# Patient Record
Sex: FEMALE | Race: WHITE | NOT HISPANIC OR LATINO | Employment: FULL TIME | URBAN - METROPOLITAN AREA
[De-identification: names, ages, dates, MRNs, and addresses within clinical notes are randomized per-mention and may not be internally consistent; named-entity substitution may affect disease eponyms.]

---

## 2019-06-03 ENCOUNTER — APPOINTMENT (OUTPATIENT)
Dept: CT IMAGING | Facility: CLINIC | Age: 34
End: 2019-06-03
Attending: EMERGENCY MEDICINE
Payer: COMMERCIAL

## 2019-06-03 ENCOUNTER — HOSPITAL ENCOUNTER (EMERGENCY)
Facility: CLINIC | Age: 34
Discharge: HOME | End: 2019-06-03
Attending: EMERGENCY MEDICINE | Admitting: EMERGENCY MEDICINE
Payer: COMMERCIAL

## 2019-06-03 VITALS
WEIGHT: 130 LBS | DIASTOLIC BLOOD PRESSURE: 77 MMHG | OXYGEN SATURATION: 98 % | HEIGHT: 69 IN | HEART RATE: 95 BPM | BODY MASS INDEX: 19.26 KG/M2 | TEMPERATURE: 98.6 F | SYSTOLIC BLOOD PRESSURE: 123 MMHG

## 2019-06-03 DIAGNOSIS — R20.2 PARESTHESIA: ICD-10-CM

## 2019-06-03 DIAGNOSIS — M54.2 NECK PAIN: ICD-10-CM

## 2019-06-03 LAB
B-HCG FREE SERPL-ACNC: <5 IU/L
CREAT BLD-MCNC: 0.6 MG/DL (ref 0.52–1.04)
GFR SERPL CREATININE-BSD FRML MDRD: >90 ML/MIN/{1.73_M2}

## 2019-06-03 PROCEDURE — 25000128 H RX IP 250 OP 636: Performed by: EMERGENCY MEDICINE

## 2019-06-03 PROCEDURE — 25000125 ZZHC RX 250: Performed by: EMERGENCY MEDICINE

## 2019-06-03 PROCEDURE — 99285 EMERGENCY DEPT VISIT HI MDM: CPT | Mod: 25

## 2019-06-03 PROCEDURE — 70450 CT HEAD/BRAIN W/O DYE: CPT

## 2019-06-03 PROCEDURE — 25000132 ZZH RX MED GY IP 250 OP 250 PS 637: Performed by: EMERGENCY MEDICINE

## 2019-06-03 PROCEDURE — 70498 CT ANGIOGRAPHY NECK: CPT

## 2019-06-03 PROCEDURE — 82565 ASSAY OF CREATININE: CPT

## 2019-06-03 PROCEDURE — 84702 CHORIONIC GONADOTROPIN TEST: CPT

## 2019-06-03 RX ORDER — METHOCARBAMOL 500 MG/1
500 TABLET, FILM COATED ORAL ONCE
Status: COMPLETED | OUTPATIENT
Start: 2019-06-03 | End: 2019-06-03

## 2019-06-03 RX ORDER — IOPAMIDOL 755 MG/ML
70 INJECTION, SOLUTION INTRAVASCULAR ONCE
Status: COMPLETED | OUTPATIENT
Start: 2019-06-03 | End: 2019-06-03

## 2019-06-03 RX ORDER — LIDOCAINE 4 G/G
1 PATCH TOPICAL ONCE
Status: DISCONTINUED | OUTPATIENT
Start: 2019-06-03 | End: 2019-06-03 | Stop reason: HOSPADM

## 2019-06-03 RX ORDER — IBUPROFEN 600 MG/1
600 TABLET, FILM COATED ORAL ONCE
Status: COMPLETED | OUTPATIENT
Start: 2019-06-03 | End: 2019-06-03

## 2019-06-03 RX ORDER — LIDOCAINE 50 MG/G
1 PATCH TOPICAL EVERY 24 HOURS
Qty: 10 PATCH | Refills: 0 | Status: SHIPPED | OUTPATIENT
Start: 2019-06-03 | End: 2019-06-13

## 2019-06-03 RX ORDER — METHOCARBAMOL 500 MG/1
500 TABLET, FILM COATED ORAL 3 TIMES DAILY PRN
Qty: 15 TABLET | Refills: 0 | Status: SHIPPED | OUTPATIENT
Start: 2019-06-03 | End: 2021-03-18

## 2019-06-03 RX ORDER — ACETAMINOPHEN 500 MG
1000 TABLET ORAL ONCE
Status: COMPLETED | OUTPATIENT
Start: 2019-06-03 | End: 2019-06-03

## 2019-06-03 RX ADMIN — ACETAMINOPHEN 1000 MG: 500 TABLET, FILM COATED ORAL at 06:32

## 2019-06-03 RX ADMIN — LIDOCAINE 1 PATCH: 560 PATCH PERCUTANEOUS; TOPICAL; TRANSDERMAL at 06:32

## 2019-06-03 RX ADMIN — IBUPROFEN 600 MG: 600 TABLET ORAL at 06:33

## 2019-06-03 RX ADMIN — IOPAMIDOL 70 ML: 755 INJECTION, SOLUTION INTRAVENOUS at 05:21

## 2019-06-03 RX ADMIN — SODIUM CHLORIDE 90 ML: 9 INJECTION, SOLUTION INTRAVENOUS at 05:21

## 2019-06-03 RX ADMIN — METHOCARBAMOL 500 MG: 500 TABLET, FILM COATED ORAL at 04:56

## 2019-06-03 ASSESSMENT — MIFFLIN-ST. JEOR: SCORE: 1359.06

## 2019-06-03 ASSESSMENT — ENCOUNTER SYMPTOMS
NECK PAIN: 1
NUMBNESS: 1
BACK PAIN: 1

## 2019-06-03 NOTE — ED TRIAGE NOTES
Neck and bilat shoulder pain.  Occasional arm tingling each side.  Injured lifting groceries last week

## 2019-06-03 NOTE — ED AVS SNAPSHOT
Emergency Department  [address] [place] [address]  Phone:  [phone]  Fax:  [phone]                                    Mignon Treviño   MRN: [id number]    Department:   Emergency Department   Date of Visit:  6/3/2019           After Visit Summary Signature Page    I have received my discharge instructions, and my questions have been answered. I have discussed any challenges I see with this plan with the nurse or doctor.    ..........................................................................................................................................  Patient/Patient Representative Signature      ..........................................................................................................................................  Patient Representative Print Name and Relationship to Patient    ..................................................               ................................................  Date                                   Time    ..........................................................................................................................................  Reviewed by Signature/Title    ...................................................              ..............................................  Date                                               Time          22EPIC Rev 08/18

## 2019-06-03 NOTE — ED PROVIDER NOTES
History     Chief Complaint:  Neck Pain and Shoulder Pain    The history is provided by the patient.      Mignon Treviño is a 34 year old female who presents with neck pain. The patient states that last Saturday (5/25/19) the patient was carrying heavy bags and developed pain in her right neck. The patient reports her pain seems to start from her neck and it alternates between the left and right side but never both. The patient states that she will sometime experience shooting pain from her neck up to the base of her skull. The patient states that she currently feels pain on the right side of neck by her trapezius, numbness and tingling down her right arm and right leg in a strip on the lateral aspect. The patient has been managing her pain using ibuprofen and tylenol, she last took tylenol at 1900 and ibuprofen at 2225. The patient states that she came to the ED because she was experiencing so much pain she could not sleep. The patient is able to walk but laying down causes more pressure in her neck.    Allergies:  Penicillins  Amoxicillin    Medications:    The patient is not currently taking any prescribed medications.    Past Medical History:    The patient does not have any past pertinent medical history.    Past Surgical History:    [place] teeth extraction    Family History:    Hypertension  Hyperlipidemia    Social History:  Smoking status: Yes  Alcohol use: Yes  Drug use: No  Presents to the ED with family     Review of Systems   Constitutional: Negative for fever.   Respiratory: Negative for shortness of breath.    Cardiovascular: Negative for chest pain.   Gastrointestinal: Negative for abdominal pain.   Musculoskeletal: Positive for back pain and neck pain.   Neurological: Positive for numbness.   All other systems reviewed and are negative.        Physical Exam     Patient Vitals for the past 24 hrs:   BP Temp Temp src Pulse Heart Rate SpO2 Height Weight   06/03/19 0542 122/74 -- -- 73 -- 98 % -- --  "  06/03/19 0445 121/74 -- -- 76 -- 99 % -- --   06/03/19 0332 134/71 98.6  F (37  C) Oral -- 90 99 % 1.753 m (5' 9\") 59 kg (130 lb)       Physical Exam  General: Appears well-developed and well-nourished.   Head: No signs of trauma.   Neck: Normal range of motion. No nuchal rigidity. No cervical adenopathy.  No midline tenderness.  +tightness to right trapezius region.   No midline tenderness.  CV: Normal rate and regular rhythm.    Resp: Effort normal and breath sounds normal. No respiratory distress.   GI: Soft. There is no tenderness.  No rebound or guarding.  Normal bowel sounds.    MSK: Normal range of motion. Ambulatory  Neuro: The patient is alert and oriented to person, place, and time.  Strength in upper/lower extremities normal and symmetrical. Sensation intact to light touch throughout. Speech normal.  Ambulatory.  GCS 15  Skin: Skin is warm and dry. No rash noted.   Psych: normal mood and affect. behavior is normal.       Emergency Department Course   Imaging:  Radiographic findings were communicated with the patient who voiced understanding of the findings.    CT-scan Head w/o contrast:  1. Normal for age.  2. No CT finding of a mass, infarct, or hemorrhage.  Result per radiology.     CT Head Neck Angio with and without contrast:   Head CTA:  1. No branch vessel occlusion, high grade stenosis, aneurysm, or high flow vascular malformation involving proximal [place] of [place] vessels.   Neck CTA:  1. No significant stenosis in the neck vessels based on NASCET criteria.  2. No evidence for dissection  As per radiology.    Laboratory:  ISTAT HCG qualitative Pregnancy POCT: <5.0  Creatinine POCT: 0.6, GFR >90    Interventions:  0456: Robaxin tablet 500 mg PO  0632: Tylenol tablet 1000 mg PO  0633: Ibuprofen 600 mg PO   0632: Lidocaine 4% patch 1 patch transdermal    Emergency Department Course:  0332: Nursing notes and vitals reviewed. I performed an exam of the patient as documented above.     IV inserted. " Medicine administered as documented above. Blood drawn. This was sent to the lab for further testing, results above.    The patient was sent for a head/neck CTA and head CT while in the emergency department, findings above.     0620: I rechecked the patient and discussed the results of her workup thus far.     Findings and plan explained to the Patient. Patient discharged home with instructions regarding supportive care, medications, and reasons to return. The importance of close follow-up was reviewed. The patient was prescribed Lidoderm and Robaxin.    I personally reviewed the laboratory results with the Patient and answered all related questions prior to discharge.     Impression & Plan    Medical Decision Making:  Mignon Treviño is a 34-year-old woman who presents due to right-sided neck pain.  She reports over the last 9 days she has had pain primarily in the right neck but sometimes her travel over to the left side.  She has had some difficulty sleeping secondary to it and she is also noticed some tingling sensation primarily in the right arm on the ulnar aspect of the arm.  On my evaluation, she was neurologically intact.  She has appropriate  strength and sensation was intact to light touch.  She did report some slight sensation in the strip down the right arm primarily.  I did obtain a CTA head and neck to evaluate for possible vertebral dissection and this was negative.  Speaking with the patient, it seems that she has had issues with her neck the past although this seems to be worse than other episodes.  She states typically her pain will resolve within a week and this has lasted longer.  Patient was given Tylenol, ibuprofen, and a lidocaine patch for symptomatic control.  She was also given a referral to physical therapy and is recommended to follow-up with a spine specialist for further evaluation.  She instructed follow up with her primary care doctor and return the ER for any worsening symptoms  particularly numbness or weakness or any other concerns.    Critical Care time:  none    Diagnosis:    ICD-10-CM    1. Neck pain M54.2 [facility]   2. Paresthesia R20.2        Disposition:  discharged to home    Discharge Medications:     Medication List      Started    lidocaine 5 % patch  Commonly known as:  LIDODERM  1 patch, Transdermal, EVERY 24 HOURS     methocarbamol 500 MG tablet  Commonly known as:  ROBAXIN  500 mg, Oral, 3 TIMES DAILY PRN          Scribe Disclosure  I, Ayla Yin, am serving as a scribe at 3:32 AM on 6/3/2019 to document services personally performed by Ricki Bean MD based on my observations and the provider's statements to me.     Ayla Yin  6/3/2019    EMERGENCY DEPARTMENT       Ricki Bean MD  06/04/19 0722

## 2019-06-04 ASSESSMENT — ENCOUNTER SYMPTOMS
ABDOMINAL PAIN: 0
FEVER: 0
SHORTNESS OF BREATH: 0

## 2019-06-18 ENCOUNTER — THERAPY VISIT (OUTPATIENT)
Dept: PHYSICAL THERAPY | Facility: CLINIC | Age: 34
End: 2019-06-18
Attending: EMERGENCY MEDICINE
Payer: COMMERCIAL

## 2019-06-18 DIAGNOSIS — M54.2 NECK PAIN ON RIGHT SIDE: ICD-10-CM

## 2019-06-18 DIAGNOSIS — M54.2 NECK PAIN: ICD-10-CM

## 2019-06-18 PROCEDURE — 97110 THERAPEUTIC EXERCISES: CPT | Mod: GP | Performed by: PHYSICAL THERAPIST

## 2019-06-18 PROCEDURE — 97161 PT EVAL LOW COMPLEX 20 MIN: CPT | Mod: GP | Performed by: PHYSICAL THERAPIST

## 2019-06-18 PROCEDURE — 97112 NEUROMUSCULAR REEDUCATION: CPT | Mod: GP | Performed by: PHYSICAL THERAPIST

## 2019-06-18 NOTE — PROGRESS NOTES
[facility] Initial Evaluation  Subjective:  The history is provided by the patient.   Mignon Treviño is a 34 year old female with a cervical spine condition.  Condition occurred with:  Lifting.  Condition occurred: at home.  This is a new condition  Onset of right neck pain on 5-25-19 after carrying heavy bags up stairs. The pain spread to base of skull, upper trap, with tingling in right arm. Occasionally the right neck pain would shift to left neck. She went to the ED on 6-3-19 because she couldn't sleep d/t the pain. CT scan of head and neck were negative. She works at a desk at a computer. Last week she had to lie down and take breaks throughout the day, she was able to work from home. The pain had been sharp now more dull. By the end of the day she has to go to bed d/t the pain of holding up her head.    Patient reports pain:  Cervical right side and cervical left side.    Pain is described as aching and burning and is intermittent and reported as 6/10.   Pain is worse in the P.M..  Symptoms are exacerbated by sitting, driving and rotating head and relieved by rest.  Since onset symptoms are gradually improving.  Special tests:  CT scan.      General health as reported by patient is good.                  Barriers include:  None as reported by the patient.    Red flags:  None as reported by the patient.                        Objective:  Standing Alignment:    Cervical/Thoracic:  Forward head  Shoulder/UE:  Rounded shoulders                                  Cervical/Thoracic Evaluation    AROM:  AROM Cervical:    Flexion:            75% (R neck pain)  Extension:       75%  Rotation:         Left: 90%     Right: 90%  Side Bend:      Left: 75%     Right:  75%      Headaches: none    DTR's:  not assessed          Cervical Dermatomes:  not assessed                                                                      [facility] Cervical Evaluation    Posture:  Sitting: fair  Standing:  "fair  Protruding Head: no  Wry Neck: no    Other Observations: posture correction: decreased R neck pain but caused \"strain\" in left neck  Movement Loss:    Flexion (Flex): min and pain  Retraction (RET): major  Extension (EXT): min  Lateral Flexion Right (LF R): min  Lateral Flexion Left (LF L): min  Rotation Right (ROT R): min  Rotation Left (ROT L): min                                                 ROS    Retraction in sitting: (limited motion) decreased R UT/neck sx's, produced L UT sx's/NW, increased motion.  Retraction supine with varying towel heights: increased R upper trap, produced R upper scapula sx's during/NW after, no change motion.      Assessment/Plan:    Patient is a 34 year old female with cervical complaints.  Her CC is right neck and UT pain but it can occasionally shift to the left neck/UT. She has had severe pain but improving over past few days. Started her with cervical retraction seated and posture correction. She reported being very fearful with her sx's and feels reassured that she can get better.  Patient has the following significant findings with corresponding treatment plan.                Diagnosis 1:  R neck pain/UT  Pain -  manual therapy, self management, education, directional preference exercise and home program  Decreased ROM/flexibility - manual therapy and therapeutic exercise  Decreased function - therapeutic activities  Impaired posture - neuro re-education    Cumulative Therapy Evaluation is: Low complexity.    Previous and current functional limitations:  (See Goal Flow Sheet for this information)    Short term and Long term goals: (See Goal Flow Sheet for this information)     Communication ability:  Patient appears to be able to clearly communicate and understand verbal and written communication and follow directions correctly.  Treatment Explanation - The following has been discussed with the patient:   RX ordered/plan of care  Anticipated outcomes  Possible risks and " side effects  This patient would benefit from PT intervention to resume normal activities.   Rehab potential is good.    Frequency:  1 X week, once daily  Duration:  for 6 weeks  Discharge Plan:  Achieve all LTG.  Independent in home treatment program.  Reach maximal therapeutic benefit.    Please refer to the daily flowsheet for treatment today, total treatment time and time spent performing 1:1 timed codes.

## 2019-06-27 ENCOUNTER — THERAPY VISIT (OUTPATIENT)
Dept: PHYSICAL THERAPY | Facility: CLINIC | Age: 34
End: 2019-06-27
Payer: COMMERCIAL

## 2019-06-27 DIAGNOSIS — M54.2 NECK PAIN ON RIGHT SIDE: ICD-10-CM

## 2019-06-27 PROCEDURE — 97110 THERAPEUTIC EXERCISES: CPT | Mod: GP | Performed by: PHYSICAL THERAPIST

## 2019-06-27 PROCEDURE — 97530 THERAPEUTIC ACTIVITIES: CPT | Mod: GP | Performed by: PHYSICAL THERAPIST

## 2019-06-27 PROCEDURE — 97140 MANUAL THERAPY 1/> REGIONS: CPT | Mod: GP | Performed by: PHYSICAL THERAPIST

## 2019-07-11 ENCOUNTER — THERAPY VISIT (OUTPATIENT)
Dept: PHYSICAL THERAPY | Facility: CLINIC | Age: 34
End: 2019-07-11
Payer: COMMERCIAL

## 2019-07-11 DIAGNOSIS — M54.2 NECK PAIN ON RIGHT SIDE: ICD-10-CM

## 2019-07-11 PROCEDURE — 97110 THERAPEUTIC EXERCISES: CPT | Mod: GP | Performed by: PHYSICAL THERAPIST

## 2019-07-11 PROCEDURE — 97140 MANUAL THERAPY 1/> REGIONS: CPT | Mod: GP | Performed by: PHYSICAL THERAPIST

## 2019-07-11 PROCEDURE — 97530 THERAPEUTIC ACTIVITIES: CPT | Mod: GP | Performed by: PHYSICAL THERAPIST

## 2019-07-25 ENCOUNTER — THERAPY VISIT (OUTPATIENT)
Dept: PHYSICAL THERAPY | Facility: CLINIC | Age: 34
End: 2019-07-25
Payer: COMMERCIAL

## 2019-07-25 DIAGNOSIS — M54.2 NECK PAIN ON RIGHT SIDE: ICD-10-CM

## 2019-07-25 PROCEDURE — 97140 MANUAL THERAPY 1/> REGIONS: CPT | Mod: GP | Performed by: PHYSICAL THERAPIST

## 2019-07-25 PROCEDURE — 97110 THERAPEUTIC EXERCISES: CPT | Mod: GP | Performed by: PHYSICAL THERAPIST

## 2019-07-25 PROCEDURE — 97112 NEUROMUSCULAR REEDUCATION: CPT | Mod: GP | Performed by: PHYSICAL THERAPIST

## 2019-08-15 ENCOUNTER — THERAPY VISIT (OUTPATIENT)
Dept: PHYSICAL THERAPY | Facility: CLINIC | Age: 34
End: 2019-08-15
Payer: COMMERCIAL

## 2019-08-15 DIAGNOSIS — M54.2 NECK PAIN ON RIGHT SIDE: ICD-10-CM

## 2019-08-15 PROCEDURE — 97112 NEUROMUSCULAR REEDUCATION: CPT | Mod: GP | Performed by: PHYSICAL THERAPIST

## 2019-08-15 PROCEDURE — 97110 THERAPEUTIC EXERCISES: CPT | Mod: GP | Performed by: PHYSICAL THERAPIST

## 2019-08-15 PROCEDURE — 97530 THERAPEUTIC ACTIVITIES: CPT | Mod: GP | Performed by: PHYSICAL THERAPIST

## 2019-08-16 NOTE — PROGRESS NOTES
Subjective:  HPI                    Objective:  System    Physical Exam    General     ROS    Assessment/Plan:    DISCHARGE REPORT    Progress reporting period is from 06/18/2019 to 08/15/2019.       SUBJECTIVE  Subjective: Patient reports her R neck has been doing better.  She reports having days where it barely bothers her, and she has more good days than bad days.  She no longer has to lie down during the day like she did previously due to pain.  She still has some days when it bothers her most of the day, and these days usually occur when she does things she normally would not be doing--i.e.carrying things, driving too long, looking down too long.  Driving will still cause R neck pain 4/10.  Pain is in the R side of her neck now and not into her arm.      Current pain level is 2/10 .     Initial Pain level: 6/10.   Changes in function:  Yes, improved neck motion with less pain, no longer having to lie down during the day.    Adverse reaction to treatment or activity: None    OBJECTIVE  Objective: Cervical AROM:  B rotation WNL, mild increase R neck pain, extension WNL, NE.  Continued significant forward head and rounded shouders posture.  Able to correct with cues.       ASSESSMENT/PLAN  Patient has been seen for 5 visits with treatment focusing on cervical and thoracic extension exercises and mobilizations as well as posture, body mechanics training, and scapular strengthening.  She has made good progress with ROM, pain, and overall function since her initial visit.  She has a good HEP and should see further improvement as she continues with this independently over time.    Updated problem list and treatment plan: Diagnosis 1:  R neck pain  Pain -  self management, education, directional preference exercise and home program  Decreased function - home program  Impaired posture - home program  STG/LTGs have been met or progress has been made towards goals:  Yes, sleep goals have been met.  Driving goals are unmet  due to persistent pain.   Assessment of Progress: The patient's condition is improving.  Self Management Plans:  Patient has been instructed in a home treatment program.  Patient is independent in a home treatment program.  Patient  has been instructed in self management of symptoms.  Patient is independent in self management of symptoms.  Mignon continues to require the following intervention to meet STG and LTG's:  PT intervention is no longer required to meet STG/LTG.    Recommendations:  This patient is ready to be discharged from therapy and continue their home treatment program.    Please refer to the daily flowsheet for treatment today, total treatment time and time spent performing 1:1 timed codes.

## 2020-07-18 ENCOUNTER — OFFICE VISIT (OUTPATIENT)
Dept: URGENT CARE | Facility: URGENT CARE | Age: 35
End: 2020-07-18
Payer: COMMERCIAL

## 2020-07-18 VITALS
TEMPERATURE: 98.3 F | OXYGEN SATURATION: 98 % | DIASTOLIC BLOOD PRESSURE: 78 MMHG | HEART RATE: 91 BPM | BODY MASS INDEX: 18.53 KG/M2 | RESPIRATION RATE: 12 BRPM | SYSTOLIC BLOOD PRESSURE: 142 MMHG | HEIGHT: 69 IN | WEIGHT: 125.13 LBS

## 2020-07-18 DIAGNOSIS — J03.90 TONSILLITIS: ICD-10-CM

## 2020-07-18 DIAGNOSIS — R07.0 THROAT PAIN: Primary | ICD-10-CM

## 2020-07-18 LAB
DEPRECATED S PYO AG THROAT QL EIA: NEGATIVE
SPECIMEN SOURCE: NORMAL

## 2020-07-18 PROCEDURE — 99203 OFFICE O/P NEW LOW 30 MIN: CPT | Performed by: FAMILY MEDICINE

## 2020-07-18 PROCEDURE — 40001204 ZZHCL STATISTIC STREP A RAPID: Performed by: FAMILY MEDICINE

## 2020-07-18 PROCEDURE — 87651 STREP A DNA AMP PROBE: CPT | Performed by: FAMILY MEDICINE

## 2020-07-18 RX ORDER — CEFDINIR 300 MG/1
300 CAPSULE ORAL 2 TIMES DAILY
Qty: 20 CAPSULE | Refills: 0 | Status: SHIPPED | OUTPATIENT
Start: 2020-07-18 | End: 2020-07-28

## 2020-07-18 RX ORDER — ACETAMINOPHEN 500 MG
1000 TABLET ORAL PRN
COMMUNITY

## 2020-07-18 ASSESSMENT — MIFFLIN-ST. JEOR: SCORE: 1331.94

## 2020-07-18 NOTE — PROGRESS NOTES
"SUBJECTIVE:Mignon Treviño is a 35 year old female with a chief complaint of sore throat.    Onset of symptoms was 1 week(s) ago.    Course of illness: worsening.    Severity moderate  Current and Associated symptoms: lymphnode swelling  Treatment measures tried include Tylenol/Ibuprofen.  Predisposing factors include None.    No past medical history on file.  Allergies   Allergen Reactions     Amoxicillin      Penicillins      Social History     Tobacco Use     Smoking status: Former Smoker     Types: Cigarettes     Smokeless tobacco: Never Used   Substance Use Topics     Alcohol use: Not on file       ROS:  SKIN: no rash  GI: no vomiting    OBJECTIVE:   BP (!) 142/78   Pulse 91   Temp 98.3  F (36.8  C) (Oral)   Resp 12   Ht 1.753 m (5' 9\")   Wt 56.8 kg (125 lb 2 oz)   SpO2 98%   BMI 18.48 kg/m  GENERAL APPEARANCE: healthy, alert and no distress  EYES: EOMI,  PERRL, conjunctiva clear  HENT: ear canals and TM's normal.  Nose normal.  Pharynx erythematous with some exudate noted.  NECK: cervical adenopathy left anterior  RESP: lungs clear to auscultation - no rales, rhonchi or wheezes  SKIN: no suspicious lesions or rashes    Rapid Strep test is negative; await throat culture results.      ICD-10-CM    1. Throat pain  R07.0 Streptococcus A Rapid Scr w Reflx to PCR     Group A Streptococcus PCR Throat Swab     cefdinir (OMNICEF) 300 MG capsule   2. Tonsillitis  J03.90 cefdinir (OMNICEF) 300 MG capsule       Symptomatic treat with gargles, lozenges, and OTC analgesic as needed.  Follow-up with primary clinic if not improving.     "

## 2020-07-19 LAB
SPECIMEN SOURCE: NORMAL
STREP GROUP A PCR: NOT DETECTED

## 2020-07-25 ENCOUNTER — OFFICE VISIT (OUTPATIENT)
Dept: URGENT CARE | Facility: URGENT CARE | Age: 35
End: 2020-07-25
Payer: COMMERCIAL

## 2020-07-25 VITALS
SYSTOLIC BLOOD PRESSURE: 114 MMHG | BODY MASS INDEX: 18.51 KG/M2 | DIASTOLIC BLOOD PRESSURE: 65 MMHG | TEMPERATURE: 98.5 F | HEART RATE: 92 BPM | RESPIRATION RATE: 14 BRPM | OXYGEN SATURATION: 97 % | WEIGHT: 125 LBS | HEIGHT: 69 IN

## 2020-07-25 DIAGNOSIS — T88.7XXA MEDICATION SIDE EFFECT: Primary | ICD-10-CM

## 2020-07-25 PROCEDURE — 99207 ZZC NO BILLABLE SERVICE THIS VISIT: CPT | Performed by: FAMILY MEDICINE

## 2020-07-25 ASSESSMENT — MIFFLIN-ST. JEOR: SCORE: 1331.38

## 2020-07-25 NOTE — PROGRESS NOTES
Subjective         HPI   Presents today after trying to get through to the nurse line 3 different times and could not get through to ask her question.  Came to  to ask.  Patient was given Cefdinir one week ago for tonsillitis (negative RST and TC)- has noticed in past day or two some increased jaw clenching.  Looked up Cefdinir and saw it was a rare side effect.  Her symptoms have since abated.    She is wondering if she can stop the medication.      Patient Active Problem List   Diagnosis     Neck pain on right side     No past surgical history on file.    Social History     Tobacco Use     Smoking status: Former Smoker     Types: Cigarettes     Smokeless tobacco: Never Used   Substance Use Topics     Alcohol use: Not on file     No family history on file.      Current Outpatient Medications   Medication Sig Dispense Refill     acetaminophen (TYLENOL) 500 MG tablet Take 1,000 mg by mouth as needed for mild pain       cefdinir (OMNICEF) 300 MG capsule Take 1 capsule (300 mg) by mouth 2 times daily for 10 days 20 capsule 0     methocarbamol (ROBAXIN) 500 MG tablet Take 1 tablet (500 mg) by mouth 3 times daily as needed 15 tablet 0     Allergies   Allergen Reactions     Penicillins      Amoxicillin Rash     Other reaction(s): *Unknown     Cefdinir      bruxism     Recent Labs   Lab Test 06/03/19  0442   GFRESTIMATED >90   GFRESTBLACK >90      BP Readings from Last 3 Encounters:   07/25/20 114/65   07/18/20 (!) 142/78   06/03/19 123/77    Wt Readings from Last 3 Encounters:   07/25/20 56.7 kg (125 lb)   07/18/20 56.8 kg (125 lb 2 oz)   06/03/19 59 kg (130 lb)                    Reviewed and updated as needed this visit by Provider         Review of Systems   ROS COMP: Constitutional, HEENT, cardiovascular, pulmonary, GI, , musculoskeletal, neuro, skin, endocrine and psych systems are negative, except as otherwise noted.      Objective    /65 (BP Location: Right arm, Patient Position: Sitting, Cuff Size: Adult  "Regular)   Pulse 92   Temp 98.5  F (36.9  C) (Tympanic)   Resp 14   Ht 1.753 m (5' 9\")   Wt 56.7 kg (125 lb)   SpO2 97%   BMI 18.46 kg/m      Physical Exam   GENERAL: healthy, alert and no distress  EYES: Eyes grossly normal to inspection, PERRL and conjunctivae and sclerae normal  HENT: ear canals and TM's normal, nose and mouth without ulcers or lesions  NECK: no adenopathy, no asymmetry, masses, or scars and thyroid normal to palpation  MS: no gross musculoskeletal defects noted, no edema  SKIN: no suspicious lesions or rashes  NEURO: Normal strength and tone, mentation intact and speech normal  PSYCH: mentation appears normal, affect normal/bright        Assessment & Plan     1. Medication side effect    At this time- patient may stop the medication with symptoms resolved.  Recommend increased fluids.  Allergy list updated.  Follow-up if no change or worsening symptoms.     No charge as patient was unable to get through phone line today to ask her simple question.    Shana Azul MD  [facility]    "

## 2020-10-13 ENCOUNTER — OFFICE VISIT (OUTPATIENT)
Dept: INTERNAL MEDICINE | Facility: CLINIC | Age: 35
End: 2020-10-13
Payer: COMMERCIAL

## 2020-10-13 VITALS — BODY MASS INDEX: 18.46 KG/M2 | HEART RATE: 72 BPM | OXYGEN SATURATION: 99 % | TEMPERATURE: 98.4 F | WEIGHT: 125 LBS

## 2020-10-13 DIAGNOSIS — K21.9 GASTROESOPHAGEAL REFLUX DISEASE WITHOUT ESOPHAGITIS: Primary | ICD-10-CM

## 2020-10-13 DIAGNOSIS — Z11.4 ENCOUNTER FOR SCREENING FOR HIV: ICD-10-CM

## 2020-10-13 LAB
ALBUMIN SERPL-MCNC: 4.2 G/DL (ref 3.4–5)
ALP SERPL-CCNC: 40 U/L (ref 40–150)
ALT SERPL W P-5'-P-CCNC: 13 U/L (ref 0–50)
ANION GAP SERPL CALCULATED.3IONS-SCNC: 6 MMOL/L (ref 3–14)
AST SERPL W P-5'-P-CCNC: 13 U/L (ref 0–45)
BASOPHILS # BLD AUTO: 0 10E9/L (ref 0–0.2)
BASOPHILS NFR BLD AUTO: 0.6 %
BILIRUB SERPL-MCNC: 0.5 MG/DL (ref 0.2–1.3)
BUN SERPL-MCNC: 6 MG/DL (ref 7–30)
CALCIUM SERPL-MCNC: 9.4 MG/DL (ref 8.5–10.1)
CHLORIDE SERPL-SCNC: 108 MMOL/L (ref 94–109)
CO2 SERPL-SCNC: 23 MMOL/L (ref 20–32)
CREAT SERPL-MCNC: 0.61 MG/DL (ref 0.52–1.04)
DIFFERENTIAL METHOD BLD: NORMAL
EOSINOPHIL # BLD AUTO: 0.1 10E9/L (ref 0–0.7)
EOSINOPHIL NFR BLD AUTO: 2.3 %
ERYTHROCYTE [DISTWIDTH] IN BLOOD BY AUTOMATED COUNT: 13.1 % (ref 10–15)
GFR SERPL CREATININE-BSD FRML MDRD: >90 ML/MIN/{1.73_M2}
GLUCOSE SERPL-MCNC: 87 MG/DL (ref 70–99)
HCT VFR BLD AUTO: 41.8 % (ref 35–47)
HGB BLD-MCNC: 13.7 G/DL (ref 11.7–15.7)
LYMPHOCYTES # BLD AUTO: 1.2 10E9/L (ref 0.8–5.3)
LYMPHOCYTES NFR BLD AUTO: 25.2 %
MCH RBC QN AUTO: 32.1 PG (ref 26.5–33)
MCHC RBC AUTO-ENTMCNC: 32.8 G/DL (ref 31.5–36.5)
MCV RBC AUTO: 98 FL (ref 78–100)
MONOCYTES # BLD AUTO: 0.4 10E9/L (ref 0–1.3)
MONOCYTES NFR BLD AUTO: 8.6 %
NEUTROPHILS # BLD AUTO: 3 10E9/L (ref 1.6–8.3)
NEUTROPHILS NFR BLD AUTO: 63.3 %
PLATELET # BLD AUTO: 259 10E9/L (ref 150–450)
POTASSIUM SERPL-SCNC: 3.9 MMOL/L (ref 3.4–5.3)
PROT SERPL-MCNC: 8.3 G/DL (ref 6.8–8.8)
RBC # BLD AUTO: 4.27 10E12/L (ref 3.8–5.2)
SODIUM SERPL-SCNC: 137 MMOL/L (ref 133–144)
WBC # BLD AUTO: 4.8 10E9/L (ref 4–11)

## 2020-10-13 PROCEDURE — 85025 COMPLETE CBC W/AUTO DIFF WBC: CPT | Performed by: PHYSICIAN ASSISTANT

## 2020-10-13 PROCEDURE — 99213 OFFICE O/P EST LOW 20 MIN: CPT | Performed by: PHYSICIAN ASSISTANT

## 2020-10-13 PROCEDURE — 80053 COMPREHEN METABOLIC PANEL: CPT | Performed by: PHYSICIAN ASSISTANT

## 2020-10-13 PROCEDURE — 87389 HIV-1 AG W/HIV-1&-2 AB AG IA: CPT | Performed by: PHYSICIAN ASSISTANT

## 2020-10-13 NOTE — PROGRESS NOTES
Subjective     Mignon Treviño is a 35 year old female who presents to clinic today for the following health issues:    HPI         GERD/Heartburn  Onset/Duration:  Chronic-flare up x1 month  Description: mid-sternum burning and epigastric pain  Progressing into chest pain   Noticed pain with swallowing   Chest pain with eating   Pt notes midline burning pain in chest constant throughout the day   Also notes when she swallows   Started Prilosec 1 week ago, lessening but still there   Accompanying Signs & Symptoms:  Does it feel like food gets stuck or trouble swallowing: no  Nausea: no, occasional am nausea   Vomiting (bloody?): no  Abdominal Pain: no  Black-Tarry stools: no  Bloody stools: no  History:  Previous similar episodes: YES  Previous ulcers: no  Precipitating factors:   Caffeine use: yes, 2-3 cups of coffee   Alcohol use: no  NSAID/Aspirin use: tylenol occasional ibuprofen   Tobacco use: no  Worse with food, sometimes with coffee, orange juice, tomatoes   Alleviating factors: None  Therapies tried and outcome:             Medications: Omeprazole (Prilosec)      Objective    Pulse 72   Temp 98.4  F (36.9  C) (Oral)   Wt 56.7 kg (125 lb)   LMP 10/02/2020 (Exact Date)   SpO2 99%   Breastfeeding No   BMI 18.46 kg/m    Body mass index is 18.46 kg/m .  Physical Exam   GENERAL: healthy, alert and no distress  RESP: lungs clear to auscultation - no rales, rhonchi or wheezes  CV: regular rates and rhythm, normal S1 S2, no S3 or S4 and no murmur, click or rub  ABDOMEN: soft, nontender, no hepatosplenomegaly, no masses and bowel sounds normal  MSC: mild tenderness noted, but pain is not replicated     No results found for this or any previous visit (from the past 24 hour(s)).        Assessment & Plan     Gastroesophageal reflux disease without esophagitis  - check basic labs, symptoms fit with acid reflux with no alarming symptoms   - omeprazole for 4-8 weeks with Pepcid complete as needed  - if no relief or  worsening symptoms, recommend GI referral, pt notify me if this is the case   - CBC with platelets and differential  - Comprehensive metabolic panel (BMP + Alb, Alk Phos, ALT, AST, Total. Bili, TP)    Encounter for screening for HIV  - HIV Antigen Antibody Combo        Return in about 1 month (around 11/13/2020) for Physical Exam.    Karin Jane PA-C  [facility]

## 2020-10-14 LAB — HIV 1+2 AB+HIV1 P24 AG SERPL QL IA: NONREACTIVE

## 2020-12-20 ENCOUNTER — HEALTH MAINTENANCE LETTER (OUTPATIENT)
Age: 35
End: 2020-12-20

## 2021-03-01 ENCOUNTER — OFFICE VISIT (OUTPATIENT)
Dept: INTERNAL MEDICINE | Facility: CLINIC | Age: 36
End: 2021-03-01
Payer: COMMERCIAL

## 2021-03-01 VITALS
OXYGEN SATURATION: 97 % | SYSTOLIC BLOOD PRESSURE: 110 MMHG | DIASTOLIC BLOOD PRESSURE: 70 MMHG | HEART RATE: 79 BPM | BODY MASS INDEX: 17.63 KG/M2 | WEIGHT: 119 LBS | TEMPERATURE: 98.1 F | HEIGHT: 69 IN

## 2021-03-01 DIAGNOSIS — Z00.00 ROUTINE GENERAL MEDICAL EXAMINATION AT A HEALTH CARE FACILITY: Primary | ICD-10-CM

## 2021-03-01 DIAGNOSIS — Z13.29 SCREENING FOR THYROID DISORDER: ICD-10-CM

## 2021-03-01 DIAGNOSIS — Z12.4 SCREENING FOR MALIGNANT NEOPLASM OF CERVIX: ICD-10-CM

## 2021-03-01 DIAGNOSIS — R13.19 ESOPHAGEAL DYSPHAGIA: ICD-10-CM

## 2021-03-01 DIAGNOSIS — Z13.6 CARDIOVASCULAR SCREENING; LDL GOAL LESS THAN 160: ICD-10-CM

## 2021-03-01 DIAGNOSIS — N63.20 LEFT BREAST LUMP: ICD-10-CM

## 2021-03-01 DIAGNOSIS — K21.00 GASTROESOPHAGEAL REFLUX DISEASE WITH ESOPHAGITIS WITHOUT HEMORRHAGE: ICD-10-CM

## 2021-03-01 DIAGNOSIS — Z11.59 ENCOUNTER FOR HEPATITIS C SCREENING TEST FOR LOW RISK PATIENT: ICD-10-CM

## 2021-03-01 DIAGNOSIS — Z11.3 SCREEN FOR STD (SEXUALLY TRANSMITTED DISEASE): ICD-10-CM

## 2021-03-01 PROCEDURE — 87624 HPV HI-RISK TYP POOLED RSLT: CPT | Performed by: INTERNAL MEDICINE

## 2021-03-01 PROCEDURE — G0145 SCR C/V CYTO,THINLAYER,RESCR: HCPCS | Performed by: INTERNAL MEDICINE

## 2021-03-01 PROCEDURE — 99214 OFFICE O/P EST MOD 30 MIN: CPT | Mod: 25 | Performed by: INTERNAL MEDICINE

## 2021-03-01 PROCEDURE — 87591 N.GONORRHOEAE DNA AMP PROB: CPT | Performed by: INTERNAL MEDICINE

## 2021-03-01 PROCEDURE — 99395 PREV VISIT EST AGE 18-39: CPT | Performed by: INTERNAL MEDICINE

## 2021-03-01 PROCEDURE — 87491 CHLMYD TRACH DNA AMP PROBE: CPT | Performed by: INTERNAL MEDICINE

## 2021-03-01 ASSESSMENT — MIFFLIN-ST. JEOR: SCORE: 1304.16

## 2021-03-01 NOTE — PROGRESS NOTES
SUBJECTIVE:   CC: Mignon Treviño is an 35 year old woman who presents for preventive health visit.       Patient has been advised of split billing requirements and indicates understanding: Yes  Healthy Habits:     Getting at least 3 servings of Calcium per day:  Yes    Bi-annual eye exam:  NO    Dental care twice a year:  Yes    Sleep apnea or symptoms of sleep apnea:  None    Diet:  Regular (no restrictions)    Frequency of exercise:  1 day/week    Duration of exercise:  15-30 minutes    Taking medications regularly:  Yes    Medication side effects:  None    PHQ-2 Total Score: 0    Additional concerns today:  Yes       1.  History of abnormal Pap smears, reviewed last Pap smear from [facility]: Last pap 9-11-19-[facility]-done at [facility]-results in chart    2.  Reports ongoing gastroesophageal reflux to a significant degree for years including mild dysphagia and center portion of her chest.  Has taken omeprazole and Zantac off and on but is been taking omeprazole regularly for over the past several months but feels her symptoms are worsening.  Notices occasional difficulty swallowing certain foods such as pizza or very spicy foods.  Can eat meat and fruits without difficulty.  Has never had upper endoscopy or H. pylori testing.  Denies changes in weights; reports normal regular bowel function.    3.  Requesting routine STD testing    4.  Reports abnormality in the left lateral breast area on her own self breast exams over the past few months..  Reports that cousin who was diagnosed with breast cancer in her mid 30s.      Today's PHQ-2 Score:   PHQ-2 ( 1999 Pf[employer]) 3/1/2021   Q1: Little interest or pleasure in doing things 0   Q2: Feeling down, depressed or hopeless 0   PHQ-2 Score 0   Q1: Little interest or pleasure in doing things Not at all   Q2: Feeling down, depressed or hopeless Not at all   PHQ-2 Score 0       Abuse: Current or Past (Physical, Sexual or Emotional) - NO  Do you feel safe in your  environment? YES    Have you ever done Advance Care Planning? (For example, a Health Directive, POLST, or a discussion with a medical provider or your loved ones about your wishes): No, advance care planning information given to patient to review.  Patient declined advance care planning discussion at this time.    Social History     Tobacco Use     Smoking status: Former Smoker     Types: Cigarettes     Smokeless tobacco: Never Used   Substance Use Topics     Alcohol use: Yes     Comment: very rare-1-2x year         Alcohol Use 3/1/2021   Prescreen: >3 drinks/day or >7 drinks/week? No   Prescreen: >3 drinks/day or >7 drinks/week? -   No flowsheet data found.    Any new diagnosis of family breast, ovarian, or bowel cancer? YES; female cousin with new diagnosis of breast cancer in mid 30s    Reviewed orders with patient.  Reviewed health maintenance and updated orders accordingly - Yes      Breast CA Risk Screening:  No flowsheet data found.      Patient under 40 years of age: Routine Mammogram Screening not recommended.   Pertinent mammograms are reviewed under the imaging tab.    History of abnormal Pap smear: Last 3 Pap and HPV Results:     History of previous abnormal Pap smears, last [facility]     Reviewed and updated as needed this visit by clinical staff  Tobacco  Allergies  Meds  Problems  Med Hx  Surg Hx  Fam Hx  Soc Hx          Reviewed and updated as needed this visit by Provider  Tobacco  Allergies  Meds  Problems  Med Hx  Surg Hx  Fam Hx           **I reviewed the information recorded in the patient's EPIC chart (including but not limited to medical history, surgical history, family history, problem list, medication list, and allergy list) and updated the information as indicated based on the patients reported information.         Review of Systems  CONSTITUTIONAL: NEGATIVE for fever, chills, change in weight  INTEGUMENTARU/SKIN: NEGATIVE for worrisome rashes, moles or  "lesions  EYES: NEGATIVE for vision changes or irritation  ENT: NEGATIVE for ear, mouth and throat problems  RESP: NEGATIVE for significant cough or SOB  BREAST: NEGATIVE for masses, tenderness or discharge  CV: NEGATIVE for chest pain, palpitations or peripheral edema  GI: NEGATIVE for nausea, abdominal pain, heartburn, or change in bowel habits  : NEGATIVE for unusual urinary or vaginal symptoms. Periods are regular.  MUSCULOSKELETAL: NEGATIVE for significant arthralgias or myalgia  NEURO: NEGATIVE for weakness, dizziness or paresthesias  PSYCHIATRIC: NEGATIVE for changes in mood or affect     OBJECTIVE:   /70   Pulse 79   Temp 98.1  F (36.7  C) (Tympanic)   Ht 1.753 m (5' 9\")   Wt 54 kg (119 lb)   LMP 02/14/2021 (Exact Date)   SpO2 97%   Breastfeeding No   BMI 17.57 kg/m    Physical Exam    GENERAL healthy, alert and no distress.  EYES conjunctivae/corneas clear. PERRL, EOM's intact  HENT: NCAT,oral and posterior pharynx without lesions or erythema, facies symmetric  NECK: Neck supple. No LAD, without thyroidmegaly or JVD.  RESP: Clear to ausculation bilaterally without wheezes or crackles. Normal BS in all fields.  CV: RRR normal S1S2 without  murmurs, rubs or gallops. PMI normal  LYMPH: no cervical lymph adenopathy appreciated  GI: NTND, no organomegaly, normal BS in all quadrants, without rebound or guarding  MS: No cyanosis, clubbing or edema noted bilaterally in Upper and Lower Extremities  SKIN: no ulcers, lesions or rash  NEURO: Alert and Oriented x 3, Gait normal. Reflexes normal and symmetric. Sensation grossly WNL..   BREAST:  Right breast normal, without masses or tenderness;   Left breast has area of asymetrical firm/hard tissue in lateral central breast. No tenderness, no axillary adenopathy; these are the same changes that she has noticed in her self breast exams.   No skin or nipple changes or axillary nodes.   Self exam is taught and encouraged.   Pelvic:  Vagina and vulva are " normal;  no discharge is noted.  Cervix normal without lesions. Uterus anteverted and mobile, normal in size and shape without tenderness.  Adnexa normal in size without masses or tenderness. Pap Smear - is completed today.     Entire physical exam chaperoned by Nurse.     Diagnostic Test Results:  Labs reviewed in Epic    ASSESSMENT/PLAN:     (Z00.00) Routine general medical examination at a health care facility  (primary encounter diagnosis)  Comment: Discussed cardiac disease risk factor modification including screening, preventing, and treating hypertension, elevated lipids, diabetes, and smoking cessation.    Discussed age appropriate cancer screening recommendations as dictated by age group and past medical history.    Recommended making better food choices as often as possible, including lower carb, lower fat, lower salt diet and moderation in any alcohol intake.    Recommended maintaining regular physical activity/exercise throughout their lifetime.  Recommended safety and injury prevention (I.e. seatbelt use, safety equipment like helmets when biking, etc).    Counseling:  Reviewed preventive health counseling, as reflected in patient instructions       Regular exercise       Healthy diet/nutrition       Vision screening       Hearing screening       Immunizations    up to date    Get covid vaccine when available for her              Safe sex practices/STD prevention    ATP III Guidelines  ICSI Preventive Guidelines   Plan:     (N63.20) Left breast lump  Comment: Firm, asymmetrical, tissue in the left lateral breast.  Needs further evaluation with diagnostic mammogram, ultrasound, and additional studies as indicated until breast cancer is ruled out.  Patient is very concerned about the possibility of breast cancer given recent diagnosis of breast cancer in a female relative around the same age as herself.  I reassured the patient that we will continue to evaluate the findings in her left breast until we  have a more definitive answer.  Discussed the work-up process through the [facility] breast center.  Discussed that breast cancer is on the differential, until we have satisfactorily performed the necessary testing to remove this as a possibility.  Plan: MA Diagnostic Digital Bilateral            (K21.00) Gastroesophageal reflux disease with esophagitis without hemorrhage  Comment: Given the extent of your symptoms despite omeprazole, recommend upper endoscopy.  Order placed.  Check vitamin B12 and CBC to rule out any sort of no absorption.  Plan: GASTROENTEROLOGY ADULT REF PROCEDURE ONLY,         Vitamin B12, Vitamin D Deficiency            (R13.10) Esophageal dysphagia  Comment: This could be esophageal spasm versus reflux versus hiatal hernia versus esophageal stricture.  Given escalating symptoms, recommend upper endoscopy.  Plan: GASTROENTEROLOGY ADULT REF PROCEDURE ONLY,         Vitamin B12, Vitamin D Deficiency            (Z13.29) Screening for thyroid disorder  Comment:   Plan: TSH with free T4 reflex            (Z13.6) CARDIOVASCULAR SCREENING; LDL GOAL LESS THAN 160  Comment: Discussed cardiac disease risk factors and cardiac disease risk factor modification.   Plan: Lipid panel reflex to direct LDL Fasting            (Z11.59) Encounter for hepatitis C screening test for low risk patient  Comment: One time screening test per CDC recommendations.   Plan: **Hepatitis C Screen Reflex to RNA FUTURE         anytime            (Z12.4) Screening for malignant neoplasm of cervix  Comment: Annual Pap smear until 3 normal results.  Plan: Pap imaged thin layer screen with HPV -         recommended age 30 - 65 years (select HPV order        below), HPV High Risk Types DNA Cervical            (Z11.3) Screen for STD (sexually transmitted disease)  Comment: Will screen for STDs at the patient's request.  There are no active  symptoms by the patient's report.   Discussed STDs in general (and  "specifically) including epidemiology, risk factors, prevention, diagnosis, treatment options including genital warts, Herpes, HIV, GC/Chlamydia, syphilis, HIV.   Plan: NEISSERIA GONORRHOEA PCR, CHLAMYDIA TRACHOMATIS        PCR, Herpes Simplex Virus 1 and 2 IgG,         Treponema Abs w Reflex to RPR and Titer,         Hepatitis B surface antigen               Patient has been advised of split billing requirements and indicates understanding: Yes  COUNSELING:  Reviewed preventive health counseling, as reflected in patient instructions       Regular exercise       Healthy diet/nutrition       Vision screening       Hearing screening       Safe sex practices/STD prevention    Estimated body mass index is 17.57 kg/m  as calculated from the following:    Height as of this encounter: 1.753 m (5' 9\").    Weight as of this encounter: 54 kg (119 lb).        She reports that she has quit smoking. Her smoking use included cigarettes. She has never used smokeless tobacco.      Counseling Resources:  ATP IV Guidelines  Pooled Cohorts Equation Calculator  Breast Cancer Risk Calculator  BRCA-Related Cancer Risk Assessment: FHS-7 Tool  FRAX Risk Assessment  ICSI Preventive Guidelines  Dietary Guidelines for Americans, 2010  USDA's MyPlate  ASA Prophylaxis  Lung CA Screening    Morro Meyer MD  [facility]  "

## 2021-03-01 NOTE — PATIENT INSTRUCTIONS
"*  referral to [facility] for mammogram and ultrasound and other testing to evaluate the left breast lump.   They will contact you to arrange this appointment.         *  Return for a \"lab only appointment\" in the next 1-2 weeks to recheck fasting labs.  Please get these labs done in a fasting state with nothing to eat for at least 8 hours prior to getting labs drawn.  You can make this \"lab only appointment\" at any [facility] lab site, contact that clinic site directly to arrange this.  I will make contact either via phone or MyChart regarding the results and any recommendations once I have reviewed the lab results.       *  You need upper endoscopy to evaluate your chronic reflux issue and swallowing problems.      --[facility] [phone] (fax [phone])      *  continue the Omeprazole until further notice.           Preventive Health Recommendations  Female Ages 26 - 39  Yearly exam:   See your health care provider every year in order to    Review health changes.     Discuss preventive care.      Review your medicines if you your doctor has prescribed any.    Until age 30: Get a Pap test every three years (more often if you have had an abnormal result).    After age 30: Talk to your doctor about whether you should have a Pap test every 3 years or have a Pap test with HPV screening every 5 years.   You do not need a Pap test if your uterus was removed (hysterectomy) and you have not had cancer.  You should be tested each year for STDs (sexually transmitted diseases), if you're at risk.   Talk to your provider about how often to have your cholesterol checked.  If you are at risk for diabetes, you should have a diabetes test (fasting glucose).  Shots: Get a flu shot each year. Get a tetanus shot every 10 years.   Nutrition:     Eat at least 5 servings of fruits and vegetables each day.    Eat whole-grain bread, whole-wheat pasta and brown rice instead " "of white grains and rice.    Get adequate Calcium and Vitamin D.        --Good Grains:  Oats, brown rice, Quinoa (these do not raise the blood sugar as much)     --Bad grains:  Anything made from wheat or white rice     (because these raise the blood sugars significantly, and the possible gluten issue from wheat for some people).      --Proteins:  Aim for \"lean proteins\" including chicken, fish, seafood, pork, turkey, and eggs (in moderation); Eat red meat only occasionally      Lifestyle    Exercise at least 150 minutes a week (30 minutes a day, 5 days of the week). This will help you control your weight and prevent disease.    Limit alcohol to one drink per day.    No smoking.     Wear sunscreen to prevent skin cancer.    See your dentist every six months for an exam and cleaning.    "

## 2021-03-03 LAB
C TRACH DNA SPEC QL NAA+PROBE: NEGATIVE
N GONORRHOEA DNA SPEC QL NAA+PROBE: NEGATIVE
SPECIMEN SOURCE: NORMAL
SPECIMEN SOURCE: NORMAL

## 2021-03-05 LAB
COPATH REPORT: NORMAL
PAP: NORMAL

## 2021-03-08 LAB
FINAL DIAGNOSIS: NORMAL
HPV HR 12 DNA CVX QL NAA+PROBE: NEGATIVE
HPV16 DNA SPEC QL NAA+PROBE: NEGATIVE
HPV18 DNA SPEC QL NAA+PROBE: NEGATIVE
SPECIMEN DESCRIPTION: NORMAL
SPECIMEN SOURCE CVX/VAG CYTO: NORMAL

## 2021-03-09 PROBLEM — N87.0 DYSPLASIA OF CERVIX, LOW GRADE (CIN 1): Status: ACTIVE | Noted: 2018-01-15

## 2021-03-10 ENCOUNTER — HOSPITAL ENCOUNTER (OUTPATIENT)
Dept: MAMMOGRAPHY | Facility: CLINIC | Age: 36
End: 2021-03-10
Attending: INTERNAL MEDICINE
Payer: COMMERCIAL

## 2021-03-10 DIAGNOSIS — N63.20 LEFT BREAST LUMP: ICD-10-CM

## 2021-03-10 DIAGNOSIS — Z11.59 ENCOUNTER FOR SCREENING FOR OTHER VIRAL DISEASES: ICD-10-CM

## 2021-03-10 PROCEDURE — 76642 ULTRASOUND BREAST LIMITED: CPT | Mod: LT

## 2021-03-10 PROCEDURE — G0279 TOMOSYNTHESIS, MAMMO: HCPCS

## 2021-03-15 DIAGNOSIS — Z11.59 ENCOUNTER FOR SCREENING FOR OTHER VIRAL DISEASES: ICD-10-CM

## 2021-03-15 LAB
LABORATORY COMMENT REPORT: NORMAL
SARS-COV-2 RNA RESP QL NAA+PROBE: NEGATIVE
SARS-COV-2 RNA RESP QL NAA+PROBE: NORMAL
SPECIMEN SOURCE: NORMAL
SPECIMEN SOURCE: NORMAL

## 2021-03-15 PROCEDURE — U0005 INFEC AGEN DETEC AMPLI PROBE: HCPCS | Performed by: INTERNAL MEDICINE

## 2021-03-15 PROCEDURE — U0003 INFECTIOUS AGENT DETECTION BY NUCLEIC ACID (DNA OR RNA); SEVERE ACUTE RESPIRATORY SYNDROME CORONAVIRUS 2 (SARS-COV-2) (CORONAVIRUS DISEASE [COVID-19]), AMPLIFIED PROBE TECHNIQUE, MAKING USE OF HIGH THROUGHPUT TECHNOLOGIES AS DESCRIBED BY CMS-2020-01-R: HCPCS | Performed by: INTERNAL MEDICINE

## 2021-03-18 ENCOUNTER — HOSPITAL ENCOUNTER (OUTPATIENT)
Dept: MAMMOGRAPHY | Facility: CLINIC | Age: 36
End: 2021-03-18
Attending: INTERNAL MEDICINE
Payer: COMMERCIAL

## 2021-03-18 DIAGNOSIS — N63.20 LEFT BREAST LUMP: ICD-10-CM

## 2021-03-18 PROCEDURE — 272N000031 US BREAST BIOPSY CORE NEEDLE LEFT

## 2021-03-18 PROCEDURE — 88305 TISSUE EXAM BY PATHOLOGIST: CPT | Mod: TC | Performed by: INTERNAL MEDICINE

## 2021-03-18 PROCEDURE — 272N000031 US BREAST BIOPSY CORE NEEDLE, EA ADDL LESION LEFT

## 2021-03-18 PROCEDURE — 999N000065 MA POST PROCEDURE LEFT

## 2021-03-18 PROCEDURE — 88305 TISSUE EXAM BY PATHOLOGIST: CPT | Mod: 26 | Performed by: PATHOLOGY

## 2021-03-18 PROCEDURE — 250N000009 HC RX 250: Performed by: INTERNAL MEDICINE

## 2021-03-18 RX ADMIN — LIDOCAINE HYDROCHLORIDE 10 ML: 10 INJECTION, SOLUTION INFILTRATION; PERINEURAL at 09:29

## 2021-03-19 LAB — COPATH REPORT: NORMAL

## 2021-03-19 NOTE — PROGRESS NOTES
After review by [facility] Radiologist, Dr. Juan Guy, Mignon Treviño was called and  given her 3/18/2021 Left  Breast Biopsy X 2 areas Pathology results (1:00-Fibroadenoma, 12:30- Fibrocystic Changes) and Follow up Recommendations (Clinical Follow up).  Mignon denies any post biopsy site issues. I encouraged her to notify her doctor if any breast changes or concerns arise.    Latosha VALDEZN, RN  Procedure Nurse  [facility]  [phone]       FINAL DIAGNOSIS:   A: Left breast, 1:00 6.0 cm from nipple, ultrasound guided core biopsy   - Fibroadenoma   - Negative for atypia and malignancy     B: Left breast, 12:30, 6.0 cm from nipple, ultrasound guided core biopsy   - Fibrocystic change   - Fibroadenomatoid change   - Negative for atypia and malignancy     Electronically signed out by:     Luh Nielsen M.D.

## 2021-03-20 DIAGNOSIS — Z11.59 ENCOUNTER FOR HEPATITIS C SCREENING TEST FOR LOW RISK PATIENT: ICD-10-CM

## 2021-03-20 DIAGNOSIS — R13.19 ESOPHAGEAL DYSPHAGIA: ICD-10-CM

## 2021-03-20 DIAGNOSIS — Z13.29 SCREENING FOR THYROID DISORDER: ICD-10-CM

## 2021-03-20 DIAGNOSIS — Z11.3 SCREEN FOR STD (SEXUALLY TRANSMITTED DISEASE): ICD-10-CM

## 2021-03-20 DIAGNOSIS — K21.00 GASTROESOPHAGEAL REFLUX DISEASE WITH ESOPHAGITIS WITHOUT HEMORRHAGE: ICD-10-CM

## 2021-03-20 DIAGNOSIS — Z13.6 CARDIOVASCULAR SCREENING; LDL GOAL LESS THAN 160: ICD-10-CM

## 2021-03-20 LAB
CHOLEST SERPL-MCNC: 183 MG/DL
HDLC SERPL-MCNC: 58 MG/DL
LDLC SERPL CALC-MCNC: 108 MG/DL
NONHDLC SERPL-MCNC: 125 MG/DL
TRIGL SERPL-MCNC: 87 MG/DL
TSH SERPL DL<=0.005 MIU/L-ACNC: 1.92 MU/L (ref 0.4–4)
VIT B12 SERPL-MCNC: 457 PG/ML (ref 193–986)

## 2021-03-20 PROCEDURE — 86803 HEPATITIS C AB TEST: CPT | Performed by: INTERNAL MEDICINE

## 2021-03-20 PROCEDURE — 36415 COLL VENOUS BLD VENIPUNCTURE: CPT | Performed by: INTERNAL MEDICINE

## 2021-03-20 PROCEDURE — 86780 TREPONEMA PALLIDUM: CPT | Mod: 90 | Performed by: INTERNAL MEDICINE

## 2021-03-20 PROCEDURE — 99000 SPECIMEN HANDLING OFFICE-LAB: CPT | Performed by: INTERNAL MEDICINE

## 2021-03-20 PROCEDURE — 80061 LIPID PANEL: CPT | Performed by: INTERNAL MEDICINE

## 2021-03-20 PROCEDURE — 82306 VITAMIN D 25 HYDROXY: CPT | Performed by: INTERNAL MEDICINE

## 2021-03-20 PROCEDURE — 87340 HEPATITIS B SURFACE AG IA: CPT | Performed by: INTERNAL MEDICINE

## 2021-03-20 PROCEDURE — 86695 HERPES SIMPLEX TYPE 1 TEST: CPT | Performed by: INTERNAL MEDICINE

## 2021-03-20 PROCEDURE — 84443 ASSAY THYROID STIM HORMONE: CPT | Performed by: INTERNAL MEDICINE

## 2021-03-20 PROCEDURE — 82607 VITAMIN B-12: CPT | Performed by: INTERNAL MEDICINE

## 2021-03-20 PROCEDURE — 86696 HERPES SIMPLEX TYPE 2 TEST: CPT | Performed by: INTERNAL MEDICINE

## 2021-03-21 LAB — T PALLIDUM AB SER QL: NONREACTIVE

## 2021-03-22 PROBLEM — E55.9 VITAMIN D DEFICIENCY: Status: ACTIVE | Noted: 2021-03-20

## 2021-03-22 LAB
DEPRECATED CALCIDIOL+CALCIFEROL SERPL-MC: 15 UG/L (ref 20–75)
HBV SURFACE AG SERPL QL IA: NONREACTIVE
HCV AB SERPL QL IA: NONREACTIVE
HSV1 IGG SERPL QL IA: 0.2 AI (ref 0–0.8)
HSV2 IGG SERPL QL IA: 3.9 AI (ref 0–0.8)

## 2021-03-28 ENCOUNTER — HOSPITAL ENCOUNTER (EMERGENCY)
Facility: CLINIC | Age: 36
Discharge: HOME | End: 2021-03-28
Attending: NURSE PRACTITIONER | Admitting: NURSE PRACTITIONER
Payer: COMMERCIAL

## 2021-03-28 ENCOUNTER — APPOINTMENT (OUTPATIENT)
Dept: GENERAL RADIOLOGY | Facility: CLINIC | Age: 36
End: 2021-03-28
Attending: NURSE PRACTITIONER
Payer: COMMERCIAL

## 2021-03-28 VITALS
SYSTOLIC BLOOD PRESSURE: 108 MMHG | RESPIRATION RATE: 18 BRPM | TEMPERATURE: 97.7 F | DIASTOLIC BLOOD PRESSURE: 72 MMHG | OXYGEN SATURATION: 96 % | HEART RATE: 78 BPM

## 2021-03-28 DIAGNOSIS — R00.2 PALPITATIONS: ICD-10-CM

## 2021-03-28 LAB
ALBUMIN SERPL-MCNC: 4.5 G/DL (ref 3.4–5)
ALP SERPL-CCNC: 36 U/L (ref 40–150)
ALT SERPL W P-5'-P-CCNC: 15 U/L (ref 0–50)
ANION GAP SERPL CALCULATED.3IONS-SCNC: 6 MMOL/L (ref 3–14)
AST SERPL W P-5'-P-CCNC: 11 U/L (ref 0–45)
BASOPHILS # BLD AUTO: 0.1 10E9/L (ref 0–0.2)
BASOPHILS NFR BLD AUTO: 1.6 %
BILIRUB DIRECT SERPL-MCNC: 0.2 MG/DL (ref 0–0.2)
BILIRUB SERPL-MCNC: 0.6 MG/DL (ref 0.2–1.3)
BUN SERPL-MCNC: 8 MG/DL (ref 7–30)
CALCIUM SERPL-MCNC: 9.3 MG/DL (ref 8.5–10.1)
CHLORIDE SERPL-SCNC: 107 MMOL/L (ref 94–109)
CO2 SERPL-SCNC: 26 MMOL/L (ref 20–32)
CREAT SERPL-MCNC: 0.59 MG/DL (ref 0.52–1.04)
DIFFERENTIAL METHOD BLD: NORMAL
EOSINOPHIL # BLD AUTO: 0.1 10E9/L (ref 0–0.7)
EOSINOPHIL NFR BLD AUTO: 1.4 %
ERYTHROCYTE [DISTWIDTH] IN BLOOD BY AUTOMATED COUNT: 12.5 % (ref 10–15)
GFR SERPL CREATININE-BSD FRML MDRD: >90 ML/MIN/{1.73_M2}
GLUCOSE SERPL-MCNC: 103 MG/DL (ref 70–99)
HCG SERPL QL: NEGATIVE
HCT VFR BLD AUTO: 41 % (ref 35–47)
HGB BLD-MCNC: 13.8 G/DL (ref 11.7–15.7)
IMM GRANULOCYTES # BLD: 0 10E9/L (ref 0–0.4)
IMM GRANULOCYTES NFR BLD: 0.3 %
INTERPRETATION ECG - MUSE: NORMAL
INTERPRETATION ECG - MUSE: NORMAL
LYMPHOCYTES # BLD AUTO: 1.7 10E9/L (ref 0.8–5.3)
LYMPHOCYTES NFR BLD AUTO: 27.3 %
MCH RBC QN AUTO: 31.7 PG (ref 26.5–33)
MCHC RBC AUTO-ENTMCNC: 33.7 G/DL (ref 31.5–36.5)
MCV RBC AUTO: 94 FL (ref 78–100)
MONOCYTES # BLD AUTO: 0.6 10E9/L (ref 0–1.3)
MONOCYTES NFR BLD AUTO: 8.8 %
NEUTROPHILS # BLD AUTO: 3.9 10E9/L (ref 1.6–8.3)
NEUTROPHILS NFR BLD AUTO: 60.6 %
NRBC # BLD AUTO: 0 10*3/UL
NRBC BLD AUTO-RTO: 0 /100
PLATELET # BLD AUTO: 290 10E9/L (ref 150–450)
POTASSIUM SERPL-SCNC: 3.3 MMOL/L (ref 3.4–5.3)
PROT SERPL-MCNC: 7.9 G/DL (ref 6.8–8.8)
RBC # BLD AUTO: 4.36 10E12/L (ref 3.8–5.2)
SODIUM SERPL-SCNC: 139 MMOL/L (ref 133–144)
TROPONIN I SERPL-MCNC: <0.015 UG/L (ref 0–0.04)
TROPONIN I SERPL-MCNC: <0.015 UG/L (ref 0–0.04)
TSH SERPL DL<=0.005 MIU/L-ACNC: 2.83 MU/L (ref 0.4–4)
WBC # BLD AUTO: 6.4 10E9/L (ref 4–11)

## 2021-03-28 PROCEDURE — 84703 CHORIONIC GONADOTROPIN ASSAY: CPT | Performed by: EMERGENCY MEDICINE

## 2021-03-28 PROCEDURE — 80048 BASIC METABOLIC PNL TOTAL CA: CPT | Performed by: EMERGENCY MEDICINE

## 2021-03-28 PROCEDURE — 71046 X-RAY EXAM CHEST 2 VIEWS: CPT

## 2021-03-28 PROCEDURE — 84484 ASSAY OF TROPONIN QUANT: CPT | Mod: 91 | Performed by: NURSE PRACTITIONER

## 2021-03-28 PROCEDURE — 84484 ASSAY OF TROPONIN QUANT: CPT | Performed by: EMERGENCY MEDICINE

## 2021-03-28 PROCEDURE — 99285 EMERGENCY DEPT VISIT HI MDM: CPT | Mod: 25

## 2021-03-28 PROCEDURE — 85025 COMPLETE CBC W/AUTO DIFF WBC: CPT | Performed by: EMERGENCY MEDICINE

## 2021-03-28 PROCEDURE — 93005 ELECTROCARDIOGRAM TRACING: CPT | Mod: 76

## 2021-03-28 PROCEDURE — 84443 ASSAY THYROID STIM HORMONE: CPT | Performed by: EMERGENCY MEDICINE

## 2021-03-28 PROCEDURE — 93005 ELECTROCARDIOGRAM TRACING: CPT

## 2021-03-28 PROCEDURE — 80076 HEPATIC FUNCTION PANEL: CPT | Performed by: EMERGENCY MEDICINE

## 2021-03-28 ASSESSMENT — ENCOUNTER SYMPTOMS
PALPITATIONS: 1
LIGHT-HEADEDNESS: 1
NUMBNESS: 1

## 2021-03-28 NOTE — ED PROVIDER NOTES
"  History   Chief Complaint:  Chest pain and palpitations    HPI   Mignon Treviño is a 35 year old female with history of anxiety and esophageal reflux who presents with chest pain and palpitations. The patient states that about an hour ago she experienced 5-10 seconds of palpitations and during this time she was unable to catch her breath. Shortly after she describes that she felt a \"wave\" come over her and she suddenly felt light-headed. She then began experiencing tingling and numbness throughout her left arm. Patient states she had a breast biopsy a couple weeks ago but does not feel that this is a similar pain. She had an Echocardiogram in 2015 due to a near syncopal episode.  She has fainted 1-2 times and had several near syncopal events.  She did take acetaminophen yesterday along with vitamin D3. She smokes marijuana occasionally but has not done it more than usual. Her last menstrual cycle was 2 weeks ago. She is not on birth control, but is not sexually active. Patient is scheduled for an endoscopy in 4 days in follow-up from worsening GERD.     Review of Systems   Cardiovascular: Positive for chest pain and palpitations.   Neurological: Positive for light-headedness and numbness.   All other systems reviewed and are negative.    Allergies:  Amoxicillin  Cefdinir  Penicillins    Medications:  Omeprazole   Famotidine    Past Medical History:    Dysplasia of cervix  Gastroesophageal reflux disease   Anxiety  Depression    Family History:    Father- diabetes mellitus type II     Social History:  Presents alone  Has a brother  Smokes marijuana occasionally    Physical Exam     Patient Vitals for the past 24 hrs:   BP Temp Temp src Pulse Resp SpO2   03/28/21 1930 118/73 -- -- 80 -- 97 %   03/28/21 1900 114/65 -- -- 69 -- 97 %   03/28/21 1845 -- -- -- -- -- 96 %   03/28/21 1830 106/80 -- -- 76 -- 96 %   03/28/21 1815 -- -- -- -- -- 96 %   03/28/21 1800 115/53 -- -- 77 -- 97 %   03/28/21 1702 (!) 142/79 97.7  F " (36.5  C) Temporal 104 18 100 %       Physical Exam  Nursing notes reviewed. Vitals reviewed.  General: Alert. Well kept.  Eyes:  Conjunctiva non-injected, non-icteric.  Neck/Throat: Moist mucous membranes. Normal voice.  Cardiac: Regular rhythm. Normal heart sounds without murmur.  No peripheral edema  Pulmonary: Clear and equal breath sounds bilaterally.   Abdomen: soft and non-tender.  Musculoskeletal: Normal gross range of motion of all 4 extremities.   Skin: Warm and dry. Normal appearance of visualized exposed skin without rashes or petechiae.  Psych: Affect normal. Good eye contact.  Neurological:  Mental: alert and oriented x 4, follows commands, no aphasia or dysarthria.   Cranial Nerves:  CN II: visual acuity - able to accurately count fingers with each eye. Visual fields intact  CN III, IV, VI: EOM intact, pupils equal and reactive  CN V: facial sensation nl  CN VII: face symmetric, no facial droop  CN VIII: hearing normal  CN IX: palate elevation symmetric, uvula at midline  CN XI SCM normal, shoulder shrug nl  CN XII: tongue midline  Motor: Strength: 5/5 in all major groups of all extremities. Normal tone. No abnormal movements. No pronator drift b/l.  Sensory: temperature, light touch intact. Romberg: negative.  Coordination: FNF test intact b/l.   Gait:  Normal.    Emergency Department Course   ECG  ECG taken at 1706, ECG read at 1710  Chest pain    Rate 85 bpm. MA interval 130 ms. QRS duration 82 ms. QT/QTc 356/423 ms. P-R-T axes 55 90 0. Normal sinus rhythm. Possible left atrial enlargement. Rightward axis. ST & T wave abnormality, consider inferior ischemia.     ECG  ECG taken at 1731, ECG read at 1740  Chest Pain  Rate 82 bpm. MA interval 148 ms. QRS duration 78 ms. QT/QTc 352/4111 ms. P-R-T axes 64 92 52. Normal sinus rhythm Rightward axis. T wave changes resolved.     Imaging:  Chest XR, PA & LAT:  Negative chest. Lungs clear. No pleural effusions. Heart   size and pulmonary vascularity are  within normal limits.  As read by Radiology.      Laboratory:   CBC: WBC 6.4, HGB 13.8,    BMP: potassium 3.3 (L), glucose 103 (H) o/w WNL (Creatinine 0.59)      Troponin (Collected 1706): <0.015   Troponin (Collected 1907): <0.015     HCG Qualitative Urine: Negative    Hepatic Panel: alkphos 36 (L) o/w WNL      TSH: 2.83     Emergency Department Course:    Reviewed:  I reviewed nursing notes, vitals, past medical history and care everywhere    Assessments:  1718 I obtained history and examined the patient as noted above.   1826 I rechecked the patient and explained findings.     Disposition:  The patient was discharged to home.     Impression & Plan   Medical Decision Making:  Mignon Treviño is a 35 year old female who presents for evaluation of palpitations.  Initial ECG shows normal sinus rhythm, possible left atrial enlargement, rightward axis. ST & T wave abnormality, consider inferior ischemia. Repeat ECG obtained and showed normal sinus rhythm. Rightward axis. T wave changes resolved and initial EKG changes are likely due to lead placement.  Troponin and delta troponin negative.  The patient is PERC negative and with no hypoxia or tachycardia or shortness of breath, PE is considered unlikely.  TSH normal.  The patient is not pregnant.  Electrolytes are normal and the patient is not anemic.  She has no neurologic deficits and stroke is also considered unlikely.  The work up in the Emergency Department is negative , monitoring and EKG have not shown any abnormal heart rhythms or concerning morphologies.  I considered a broad differential diagnosis including acute coronary syndrome, myocardial infarction, pulmonary embolism, electrolyte abnormalities, drug reaction, thyroid disease, caffeine or other stimulant, anxiety, amongst others. A Zio patch event monitor was ordered.  No serious etiology for the palpitations were detected today during this visit and I feel the patient is safe for discharge.    Close follow up with primary care is indicated for follow-up on event monitor results and to ensure resolution of symptoms, this was made clear to the patient, who understands.      Diagnosis:    ICD-10-CM    1. Palpitations  R00.2 Leadless EKG Monitor 3 to 14 Days       Discharge Medications:  Discharge Medication List as of 3/28/2021  8:06 PM          Scribe Disclosure:  I, Linda Estrada, am serving as a scribe at 5:17 PM on 3/28/2021 to document services personally performed by Mary Bell, DUSTIN based on my observations and the provider's statements to me.             Mary Bell, CNP  03/29/21 0114

## 2021-03-28 NOTE — ED TRIAGE NOTES
Patient complaining of chest pain.  States she had 5-10 seconds of palpitations and now complaining of left arm numbness and tingling.

## 2021-04-01 ENCOUNTER — HOSPITAL ENCOUNTER (OUTPATIENT)
Dept: CARDIOLOGY | Facility: CLINIC | Age: 36
Discharge: HOME | End: 2021-04-01
Attending: NURSE PRACTITIONER | Admitting: NURSE PRACTITIONER
Payer: COMMERCIAL

## 2021-04-01 DIAGNOSIS — R00.2 PALPITATIONS: ICD-10-CM

## 2021-04-01 PROCEDURE — 93244 EXT ECG>48HR<7D REV&INTERPJ: CPT | Performed by: INTERNAL MEDICINE

## 2021-04-01 PROCEDURE — 93242 EXT ECG>48HR<7D RECORDING: CPT

## 2021-04-02 ENCOUNTER — TRANSFERRED RECORDS (OUTPATIENT)
Dept: HEALTH INFORMATION MANAGEMENT | Facility: CLINIC | Age: 36
End: 2021-04-02

## 2021-04-03 ENCOUNTER — APPOINTMENT (OUTPATIENT)
Dept: GENERAL RADIOLOGY | Facility: CLINIC | Age: 36
End: 2021-04-03
Attending: EMERGENCY MEDICINE
Payer: COMMERCIAL

## 2021-04-03 ENCOUNTER — HOSPITAL ENCOUNTER (EMERGENCY)
Facility: CLINIC | Age: 36
Discharge: HOME | End: 2021-04-03
Attending: EMERGENCY MEDICINE | Admitting: EMERGENCY MEDICINE
Payer: COMMERCIAL

## 2021-04-03 VITALS
RESPIRATION RATE: 18 BRPM | HEIGHT: 69 IN | TEMPERATURE: 97.9 F | SYSTOLIC BLOOD PRESSURE: 123 MMHG | DIASTOLIC BLOOD PRESSURE: 70 MMHG | BODY MASS INDEX: 17.57 KG/M2 | OXYGEN SATURATION: 98 % | HEART RATE: 95 BPM

## 2021-04-03 DIAGNOSIS — R10.13 EPIGASTRIC PAIN: ICD-10-CM

## 2021-04-03 PROCEDURE — 71046 X-RAY EXAM CHEST 2 VIEWS: CPT

## 2021-04-03 PROCEDURE — 250N000009 HC RX 250: Performed by: EMERGENCY MEDICINE

## 2021-04-03 PROCEDURE — 99283 EMERGENCY DEPT VISIT LOW MDM: CPT | Mod: 25

## 2021-04-03 PROCEDURE — 250N000013 HC RX MED GY IP 250 OP 250 PS 637: Performed by: EMERGENCY MEDICINE

## 2021-04-03 RX ORDER — HYDROXYZINE HYDROCHLORIDE 25 MG/1
25 TABLET, FILM COATED ORAL EVERY 6 HOURS PRN
Qty: 20 TABLET | Refills: 0 | Status: ON HOLD | OUTPATIENT
Start: 2021-04-03 | End: 2022-11-23

## 2021-04-03 RX ADMIN — LIDOCAINE HYDROCHLORIDE 30 ML: 20 SOLUTION ORAL; TOPICAL at 10:33

## 2021-04-03 ASSESSMENT — ENCOUNTER SYMPTOMS
LIGHT-HEADEDNESS: 0
TROUBLE SWALLOWING: 1

## 2021-04-03 NOTE — ED PROVIDER NOTES
"  History   Chief Complaint:  Chest Pain       HPI   Mignon Treviño is a 35 year old female who presents with chest pain. The patient says that she had an upper endoscopy with [facility] yesterday for acid reflux. She says that after the procedure she had some chest discomfort with pain when swallowing. Today the pain worsened and she called the on-call at [facility] and was told to come to the ED. She denies any lightheadedness.       Review of Systems   HENT: Positive for trouble swallowing (Pain).    Cardiovascular: Positive for chest pain.   Neurological: Negative for light-headedness.   All other systems reviewed and are negative.      Allergies:  Amoxicillin  Cefdinir  Penicillins    Medications:  Tylenol  Famotidine  Omeprazole     Past Medical History:    Anxiety  Vitamin D deficiency       Past Surgical History:    Colposcopy  Endoscopy     Family History:    Diabetes  Breast cancer     Social History:  Patient presents to the ED alone.    Physical Exam     Patient Vitals for the past 24 hrs:   BP Temp Temp src Pulse Resp SpO2 Height   04/03/21 0909 123/70 97.9  F (36.6  C) Temporal 95 18 98 % 1.753 m (5' 9\")       Physical Exam  General: Resting on the gurney, Appears comfortable but somewhat anxious  Head:  The scalp, face, and head appear normal  Mouth/Throat: Mucus membranes are moist  CV:  Regular rate and rhythm.    Normal S1 and S2  No pathological murmur   Resp:  Breath sounds clear and equal bilaterally    Non-labored, no retractions or accessory muscle use    No coarseness    No wheezing     Clear auscultations in all fields.  GI:  Abdomen is soft, no rigidity    No tenderness to palpation  MS:  Normal motor assessment of all extremities.    Good capillary refill noted.     Chest nontender to palpation.    Extremities- no lower extremity redness, swelling, or excess warmth  Skin:   No rash or lesions noted.  Neuro:   Speech is normal and fluent. No apparent deficit.  Psych: Awake. Alert.  Normal affect.  "     Appropriate interactions.     Emergency Department Course       Imaging:  Chest XR,  PA & LAT  No acute cardiopulmonary disease. No visible pneumothorax  or mediastinal gas.  Reading per radiology     Procedures    Emergency Department Course:    Reviewed:  0915 I reviewed the patient's nursing notes, vitals, past medical records, Care Everywhere.        Assessments:  0932 I performed an exam of the patient as documented above.   1033 Patient rechecked and updated.      Interventions:  1033 GI cocktail 30 mL Oral       Disposition:  The patient was discharged to home.       Impression & Plan       Medical Decision Making:  Mignon Treviño is a 35 year old female who presented to the ED today for evaluation of upper abdominal pain. History as above in the HPI, notable for an endoscopy yesterday with [facility]. CXR obtained to evaluate for air or widened mediastinum. Imaging was negative for any acute pathology. Interventions here in the ED included a GI cocktail. Upon reevaluation, the patient appeared greatly improved. It is my suspicion that the patient's symptoms are due to irritation from her endoscopy. They should follow-up with their primary care provider or GI within the next few days for recheck of their symptoms. The patient was told to avoid anti-inflammatory medications such as ibuprofen to reduce gastric upset. They were also advised to avoid eating before bed, consuming smaller more frequent meals, and avoid spicy/acidic foods.  Soft diet until discussed with [facility] or improved symptoms.  They will return to the ED for worsening abdominal pain, uncontrolled nausea/vomiting, or if new concerns arise. All questions were answered prior to the patient's discharge. She was in agreement with the plan stated above.     Diagnosis:    ICD-10-CM    1. Epigastric pain  R10.13        Discharge Medications:  Discharge Medication List as of 4/3/2021 11:30 AM      START taking these medications    Details   hydrOXYzine  (ATARAX) 25 MG tablet Take 1 tablet (25 mg) by mouth every 6 hours as needed for anxiety, Disp-20 tablet, R-0, E-Prescribe             Scribe Disclosure:  I, Rafael Blank, am serving as a scribe at 9:16 AM on 4/3/2021 to document services personally performed by Mimi Gallardo MD based on my observations and the provider's statements to me.          Mimi Gallardo MD  04/03/21 2552

## 2021-04-03 NOTE — ED TRIAGE NOTES
Patient complaning of chest pain.  States she had an upper GI yesterday for acid reflux and pain started after the procedure

## 2021-04-03 NOTE — DISCHARGE INSTRUCTIONS
Return for worsened pain, shortness of breath, fever, or difficulty swallowing.    If you feel your condition has changed or worsened, please call your doctor or return to the emergency department right away.

## 2021-04-06 ENCOUNTER — VIRTUAL VISIT (OUTPATIENT)
Dept: INTERNAL MEDICINE | Facility: CLINIC | Age: 36
End: 2021-04-06
Payer: COMMERCIAL

## 2021-04-06 DIAGNOSIS — F41.0 PANIC ATTACK: ICD-10-CM

## 2021-04-06 DIAGNOSIS — R00.2 PALPITATIONS: ICD-10-CM

## 2021-04-06 DIAGNOSIS — K21.9 GASTROESOPHAGEAL REFLUX DISEASE WITHOUT ESOPHAGITIS: ICD-10-CM

## 2021-04-06 DIAGNOSIS — K44.9 HIATAL HERNIA: ICD-10-CM

## 2021-04-06 DIAGNOSIS — F41.1 GENERALIZED ANXIETY DISORDER: Primary | ICD-10-CM

## 2021-04-06 PROCEDURE — 99214 OFFICE O/P EST MOD 30 MIN: CPT | Mod: GT | Performed by: INTERNAL MEDICINE

## 2021-04-06 RX ORDER — LORAZEPAM 1 MG/1
.5-1 TABLET ORAL DAILY PRN
Qty: 10 TABLET | Refills: 0 | Status: SHIPPED | OUTPATIENT
Start: 2021-04-06 | End: 2021-04-06

## 2021-04-06 RX ORDER — LORAZEPAM 1 MG/1
.5-1 TABLET ORAL DAILY PRN
Qty: 10 TABLET | Refills: 0 | Status: SHIPPED | OUTPATIENT
Start: 2021-04-06

## 2021-04-06 RX ORDER — ESCITALOPRAM OXALATE 10 MG/1
TABLET ORAL
Qty: 30 TABLET | Refills: 0 | Status: SHIPPED | OUTPATIENT
Start: 2021-04-06 | End: 2021-04-09 | Stop reason: DRUGHIGH

## 2021-04-06 NOTE — PATIENT INSTRUCTIONS
"  ANXIETY:     *  You have a long standing history of anxiety.  You did not cause this by any specific act.  It just happens and can be made worse with increased stressors and life events.  Depression and Anxiety are very common in [place], it is estimated that 1 out of 5 Americans may experience depression at some point in their lives.     *  No one \"causes\" depression or anxiety, it NOT a choice.      *  Due to the extent that your mood is bothering you, I am going to ask that you take a specific medication to help reduce the symptoms and the impact of the depression on your life.  Anti-depressant medications do not \"fix\" any specific problem in your life, but they hopefully will provide a more stable \"emotional platform\" from which to go about your life.       --Start Escitalopram (generic Lexapro) 5 mg (1/2 of 10 mg tablet) once per day for 7 days, then 10 mg once per day.  Let us know if you experience any significant side effects, which may include disordered appetite, disordered sleeping, intestinal upset.  Specifically let us know if you experience increase anxiety or depression.      *  Lorazepam 1/2 or 1 tablet as needed FOR ACUTE ANXIETY OR PANIC ATTACKS.  Do not drink alcohol after taking this, do not drive after taking this, do not use heavy machinery or perform dangerous tasks after taking due to the possible drowsiness.      *  I would strongly recommend working with a mental health counselor/therapist to help understand your mood better, to learn better coping skills, and to find ways above and beyond medications to to help manage your mood condition.  I palced an order, you will receive a call to arrange an appointment.       *  Google \"CBT for panic\" and check out the book \"Mind over Mood\" for further information on other methods of managing anxiety symptoms beyond medications.     *  Visit the web site National [place] of Mental Health web site (http://www.nimh.nih.gov/index.shtml)  for more " "information on anxiety, depression, and other conditions      *  Call [phone] ([facility] Internal Medicine Nurse Line) with any questions or concerns about medication side effects.    *  Return to see me via virtual visit in 1 month regardless of how you feel to follow up on these issues. Use [employer] or Call [phone] to schedule the appointment with me and lab appointment.        Resources for any acute mental health issues:    *Crisis Intervention: 622.575.5479 or 996-537-3501 (TTY: 544.993.6905).  Call anytime for help.   *Crisis text line: Text \"START\" to 364-699 (Free - confidential - 24 hours per day/7 days per week)  *[facility] Crisis: COPE: (500.328.4175) 24 hour mobile crisis support for people having a mental health crisis in [facility].    *National Suicide Prevention  4-373-327-8927  *Acute Psychiatric Services (167-262-0607). 24-hour walk-in crisis psychiatric support at [facility]; Emergency Medications Clinic available 7:30am - 2:00pm  *Crisis Connection: (910.989.4087) 24-hour confidential telephone counseling    *[facility] Emergency Room: [phone]  *Suicide Awareness Voices of Education (SAVE) (www.save.org)  365.690.SAVE (7570)  *Mental Health Consumer/Survivor Network of [place] (www.mhcsn.net): 847.485.4946 or 759-427-6451    *Mental Health Association of [place] (www.mentalhealth.org): 630.530.9911 or 945-007-4082    *Self- Management and Recovery Training., SMART-- Toll free: 183.532.3376  www.smartrecovery.org    *MACHELLE MORTON (National [place] on Mental Illness):  phone: 624.255.2012  toll free: 1.888.MACHELLE.HELPS  fax: [phone]  email: [email or web address]  **Call or visit website for information on support groups for individuals or families.       "

## 2021-04-06 NOTE — PROGRESS NOTES
"Mignon is a 35 year old who is being evaluated via a billable video visit.      How would you like to obtain your AVS? Peace  If the video visit is dropped, the invitation should be resent by: Peace   Will anyone else be joining your video visit? No        Assessment & Plan       (F41.1) Generalized anxiety disorder  (primary encounter diagnosis)  Comment:   *  A long standing history of anxiety.  You did not cause this by any specific act.  It just happens and can be made worse with increased stressors and life events.  Depression and Anxiety are very common in [place], it is estimated that 1 out of 5 Americans may experience depression at some point in their lives.     *  No one \"causes\" depression or anxiety, it NOT a choice.      *  Due to the extent that your mood is bothering you, I am going to ask that you take a specific medication to help reduce the symptoms and the impact of the depression on your life.  Anti-depressant medications do not \"fix\" any specific problem in your life, but they hopefully will provide a more stable \"emotional platform\" from which to go about your life.       --Start Escitalopram (generic Lexapro) 5 mg (1/2 of 10 mg tablet) once per day for 7 days, then 10 mg once per day.  Let us know if you experience any significant side effects, which may include disordered appetite, disordered sleeping, intestinal upset.  Specifically let us know if you experience increase anxiety or depression.      *  Lorazepam 1/2 or 1 tablet as needed FOR ACUTE ANXIETY OR PANIC ATTACKS.  Do not drink alcohol after taking this, do not drive after taking this, do not use heavy machinery or perform dangerous tasks after taking due to the possible drowsiness.      *  I would strongly recommend working with a mental health counselor/therapist to help understand your mood better, to learn better coping skills, and to find ways above and beyond medications to to help manage your mood condition.  I palced an " "order, you will receive a call to arrange an appointment.       *  Google \"CBT for panic\" and check out the book \"Mind over Mood\" for further information on other methods of managing anxiety symptoms beyond medications.     *  Visit the web site National [place] of Mental Health web site (http://www.nimh.nih.gov/index.shtml)  for more information on anxiety, depression, and other conditions      *  Call [phone] ([facility] Internal Medicine Nurse Line) with any questions or concerns about medication side effects.    *  Return to see me via virtual visit in 1 month regardless of how you feel to follow up on these issues. Use [employer] or Call [phone] to schedule the appointment with me and lab appointment.    Plan: escitalopram (LEXAPRO) 10 MG tablet, LORazepam         (ATIVAN) 1 MG tablet, MENTAL HEALTH REFERRAL  -        Adult; Outpatient Treatment;         Individual/Couples/Family/Group Therapy/Health         Psychology; Other: [facility]         [phone]; We will contact you to schedule        the appointment or please call with any         questions, DISCONTINUED: LORazepam (ATIVAN) 1         MG tablet            (F41.0) Panic attack  Comment: As above, longstanding history of anxiety and panic.  Start Escitalopram.  Okay for occasional use lorazepam as needed for panic attack, however the more beneficial effect from his medication will be simply having in her possession in case of panic attack should occur.  Plan: escitalopram (LEXAPRO) 10 MG tablet, LORazepam         (ATIVAN) 1 MG tablet, MENTAL HEALTH REFERRAL  -        Adult; Outpatient Treatment;         Individual/Couples/Family/Group Therapy/Health         Psychology; Other: [facility]         [phone]; We will contact you to schedule        the appointment or please call with any         questions, DISCONTINUED: LORazepam (ATIVAN) 1         MG tablet            (K44.9) Hiatal hernia  Comment: Had upper endoscopy 3 days " ago showing a mild hiatal hernia.  Was told to continue omeprazole.  We will review documentation of the procedure when available.  Reviewed basics of hiatal hernias.  Plan: omeprazole (PRILOSEC) 20 MG DR capsule            (R00.2) Palpitations  Comment: Palpitations as described elsewhere in the chart.  Continue outpatient Zio patch evaluation return via the mail as instructed.  Plan:     (K21.9) Gastroesophageal reflux disease without esophagitis  Comment: Discussed the functional nature of this condition regarding what they eat, how they eat, when they eat, and how much they eat as all contributing to gastroesophageal symptoms.    Recommend anti-reflux diet and eating patterns emphasizing smaller more frequent meals and timing relative to bedtime.  Also recommend avoiding tomatoes, onions, spicy foods, caffeine, or any other food/beverage that cause increased reflux.    If symptoms not controlled on current therapies, then need to return for further evaluation and consideration of further studies.   Plan: omeprazole (PRILOSEC) 20 MG DR capsule                            Return in about 4 weeks (around 5/4/2021) for Via Virtual visit (video or phone), Mood check.    Morro Meyer MD  [facility] ANNE Crow is a 35 year old who presents for the following health issues     HPI     ED/UC Followup:    Facility:  [facility] ED  Date of visit: 3/28/2021, 4/3/2021  Reason for visit: palpitations, epigastric pain   Current Status: since having palpitations anxiety and panic attacks have started back up again, epigastric pain has subsided    Reviewed all available labs, documents, and imaging available from [facility].       History of anxiety dating back at least since 2011.    treaed for acute anxiety, panic attacks, and obsessive-compulsive disorder in 2011 for few years with lexparo successfuly, tapered off.   Return of anixty in  2017, resumed lexapro for 12 years, then tapered off.     Had ongoign lower level anxiety on most dys, just tries to manage on her own.     Last 3 weeks things have gotten much worse.     Panic attacks are svere with signficiant shortness of breath, chest pressure, difficulty swallowing.    Wants to go back on meds.     Was workign with mental health counesling in 2010, but not since then  [facility] exericse for relaxcation.          2.  Recent palpiations, associated with anxiety/stress  ER placed ZIO patch, will return on Thursday  Has not had same fluttering in chest since that time.     3.  ongoin reflux recdent;ly, recent EGD showed reflux and small hiatal hernia   Was told to contineu omeprazole.   Biopsies pending for bartrtett;s but suspected to be negative.     4.  Recent breast lump, underwent diagnostic mammogram, ultrasound,  biuopsy.   Fibroadenmoa, no mailgnancy.     **I reviewed the information recorded in the patient's EPIC chart (including but not limited to medical history, surgical history, family history, problem list, medication list, and allergy list) and updated the information as indicated based on the patients reported information.         Review of Systems   Constitutional, HEENT, cardiovascular, pulmonary, gi and gu systems are negative, except as otherwise noted.      Objective           Vitals:  No vitals were obtained today due to virtual visit.    Physical Exam   GENERAL: Healthy, alert and no distress  EYES: Eyes grossly normal to inspection.  No discharge or erythema, or obvious scleral/conjunctival abnormalities.  RESP: No audible wheeze, cough, or visible cyanosis.  No visible retractions or increased work of breathing.    SKIN: Visible skin clear. No significant rash, abnormal pigmentation or lesions.  NEURO: Cranial nerves grossly intact.  Mentation and speech appropriate for age.  PSYCH: Mentation appears normal, affect normal/bright, judgement and insight intact, normal  speech and appearance well-groomed.                Video-Visit Details    Type of service:  Video Visit    Video Start Time: 1305 PM    Video End TIME:  1338 PM    Originating Location (pt. Location): Home    Distant Location (provider location):  [facility]     Platform used for Video Visit: [employer]

## 2021-04-07 ENCOUNTER — NURSE TRIAGE (OUTPATIENT)
Dept: INTERNAL MEDICINE | Facility: CLINIC | Age: 36
End: 2021-04-07

## 2021-04-07 NOTE — TELEPHONE ENCOUNTER
"Pt had virtual visit and prescribed Lexapro yesterday 4-7-21.Pt took half a tab 5mg last night at 10:30 pm. Pt states she took the Lexapro along with a OTC pepcid.     About 30 minutes after she took the Lexapro she felt numbness and tingling all over her body, specifically in her face, tongue and bilateral hands. Pt said these symptoms lasted all night and into this morning.     Pt states she was on Lexapro in 2017 and never had a reaction like this.     Today, pt reports continued numbness and tingling in her bilateral hands and face and new blurry vision. Pt states \"it almost feels like I am drunk\". Pt denies weakness anywhere, droopy smile or altered gate.     Pt sounds extremely anxious, is tearful and stated several times that she \"feels like I'm going to have a panic attack.\" Pt states she has had panic attacks in the past with associated tingling in her hands but never anything this bad or that lasted this long.     Pt also got a rx for Lorazepam yesterday for acute panic attack but stated she doesn't want to take it because she's afraid her symptoms are a medication reaction.     Per protocol Advised pt to call 911 for new onset and continuing neurological symptoms of numbness/tingling and blurry vision. Pt did not want to call 911 but will have her friend take her to the ER now.   Reason for Disposition    New neurologic deficit that is present NOW, sudden onset of ANY of the following: * Weakness of the face, arm, or leg on one side of the body * Numbness of the face, arm, or leg on one side of the body * Loss of speech or garbled speech    Additional Information    Negative: Difficult to awaken or acting confused (e.g., disoriented, slurred speech)    Answer Assessment - Initial Assessment Questions  1. SYMPTOM: \"What is the main symptom you are concerned about?\" (e.g., weakness, numbness)      Tingling all over body, numbness in both hands. Tingling tongue and face last night, none today. Felt dizzy  2. " "ONSET: \"When did this start?\" (minutes, hours, days; while sleeping)      About 30 minutes after taking lexapro yesterday at 10:30pm  3. LAST NORMAL: \"When was the last time you were normal (no symptoms)?\"      Felt okay before she took lexaro last night. Has had panic attack in the past and has had similar symptoms of numbness and tingling.   4. PATTERN \"Does this come and go, or has it been constant since it started?\"  \"Is it present now?\"      Yesterday tingling and numbness was constant. Today it is intermittant and only in hands.   5. CARDIAC SYMPTOMS: \"Have you had any of the following symptoms: chest pain, difficulty breathing, palpitations?\"      Pt feels like heart is racing now, says shes too anxious to check it. Denies chest pain. Pt states she is breathing okay.   6. NEUROLOGIC SYMPTOMS: \"Have you had any of the following symptoms: headache, dizziness, vision loss, double vision, changes in speech, unsteady on your feet?\"      Pt states she can't focus her eyes well \"it feel like I'm a little bit drunk\". Denies Headache.   7. OTHER SYMPTOMS: \"Do you have any other symptoms?\"      Overall feeling very very anxious and like I'm going to have a panic attack.   Asked pt in the past what has helped with her anxiety. Pt stated she doesn't want to take it right now cause she feels like her symptoms might be related to medication.   8. PREGNANCY: \"Is there any chance you are pregnant?\" \"When was your last menstrual period?\"      no    Protocols used: NEUROLOGIC DEFICIT-A-[place]    Kavitha Simmons RN    "

## 2021-04-07 NOTE — TELEPHONE ENCOUNTER
Pt called 911 and they came to her home and decided that she was stable with no allergic reaction . Pt would like to change medications if able . Please advise .Altagracia Uribe RN

## 2021-04-07 NOTE — TELEPHONE ENCOUNTER
I doubt this was an allergic reaction, her symptoms almost certainly were due to acute anxiety/panic.     Hold the Lexapro until we have a chance to follow up.   Use the lorazepam in case of acute anxiety, this is why I gave it to her.     Schedule a virtual visit, either telephone or video in next day or two to discuss further.

## 2021-04-09 ENCOUNTER — VIRTUAL VISIT (OUTPATIENT)
Dept: INTERNAL MEDICINE | Facility: CLINIC | Age: 36
End: 2021-04-09
Payer: COMMERCIAL

## 2021-04-09 DIAGNOSIS — F41.1 GENERALIZED ANXIETY DISORDER: Primary | ICD-10-CM

## 2021-04-09 PROCEDURE — 99214 OFFICE O/P EST MOD 30 MIN: CPT | Mod: TEL | Performed by: INTERNAL MEDICINE

## 2021-04-09 RX ORDER — ESCITALOPRAM OXALATE 5 MG/1
5 TABLET ORAL DAILY
Qty: 30 TABLET | Refills: 0 | Status: SHIPPED | OUTPATIENT
Start: 2021-04-09 | End: 2021-05-05

## 2021-04-09 ASSESSMENT — ANXIETY QUESTIONNAIRES
7. FEELING AFRAID AS IF SOMETHING AWFUL MIGHT HAPPEN: NEARLY EVERY DAY
6. BECOMING EASILY ANNOYED OR IRRITABLE: NEARLY EVERY DAY
3. WORRYING TOO MUCH ABOUT DIFFERENT THINGS: NEARLY EVERY DAY
IF YOU CHECKED OFF ANY PROBLEMS ON THIS QUESTIONNAIRE, HOW DIFFICULT HAVE THESE PROBLEMS MADE IT FOR YOU TO DO YOUR WORK, TAKE CARE OF THINGS AT HOME, OR GET ALONG WITH OTHER PEOPLE: SOMEWHAT DIFFICULT
5. BEING SO RESTLESS THAT IT IS HARD TO SIT STILL: SEVERAL DAYS
1. FEELING NERVOUS, ANXIOUS, OR ON EDGE: NEARLY EVERY DAY
2. NOT BEING ABLE TO STOP OR CONTROL WORRYING: NEARLY EVERY DAY
GAD7 TOTAL SCORE: 19

## 2021-04-09 ASSESSMENT — PATIENT HEALTH QUESTIONNAIRE - PHQ9: 5. POOR APPETITE OR OVEREATING: NEARLY EVERY DAY

## 2021-04-09 NOTE — PATIENT INSTRUCTIONS
*I suspect that the side effects experienced were due to slowing the tablet and ingesting a higher dose through the gumline and lips.    *Start Escitalopram 5 mg once per day (take 1 single 5 mg tablet, do not split the 10 mg tablets.    *  Lorazepam 1/2 or 1 tablet twice per day as needed for ACUTE anxiety only.    Take the lowest dose needed, start with 1/2 tablet.   Do not drink alcohol after taking this, do not drive after taking this, do not use heavy machinery or perform dangerous tasks after taking due to the possible drowsiness.       *If you experience any further side effects with the citalopram, discontinue the medicine and contact me on Monday.    *If you tolerate the medicine fine, stay at the 5 mg dose once per day and follow-up with me as we planned in 4 weeks..

## 2021-04-09 NOTE — PROGRESS NOTES
Mignon is a 35 year old who is being evaluated via a billable video visit.      How would you like to obtain your AVS? MyChart  If the video visit is dropped, the invitation should be resent by: text [phone]  Will anyone else be joining your video visit? No        Assessment & Plan       (F41.1) Generalized anxiety disorder  (primary encounter diagnosis)  Comment:     *I suspect that the side effects experienced were due to slowing the tablet and ingesting a higher dose through the gumline and lips.    *Start Escitalopram 5 mg once per day (take 1 single 5 mg tablet, do not split the 10 mg tablets.    *  Lorazepam 1/2 or 1 tablet twice per day as needed for ACUTE anxiety only.    Take the lowest dose needed, start with 1/2 tablet.   Do not drink alcohol after taking this, do not drive after taking this, do not use heavy machinery or perform dangerous tasks after taking due to the possible drowsiness.       *If you experience any further side effects with the citalopram, discontinue the medicine and contact me on Monday.    *If you tolerate the medicine fine, stay at the 5 mg dose once per day and follow-up with me as we planned in 4 weeks..      Plan: escitalopram (LEXAPRO) 5 MG tablet                            Return in about 4 weeks (around 5/7/2021) for Mood check.    Morro Meyer MD  [facility]    Baljeet Crow is a 35 year old who presents for the following health issues     HPI     Anxiety Follow-Up    How are you doing with your anxiety since your last visit? No change    Are you having other symptoms that might be associated with anxiety? Yes:  panic attacks, numbness and tingling, SOB, lightheadedness    Have you had a significant life event? Health Concerns     Are you feeling depressed? No    Do you have any concerns with your use of alcohol or other drugs? No    Social History     Tobacco Use     Smoking status: Former Smoker     Types: Cigarettes      Smokeless tobacco: Never Used   Substance Use Topics     Alcohol use: Yes     Comment: very rare-1-2x year     Drug use: Yes     Types: Marijuana     Comment: occasionally     No flowsheet data found.  No flowsheet data found.    Seen earlier this week for acute anxiety.  Started on Escitalopram, 5 mg (one half of 10 mg tablet) daily.  When she took her first dose, she split the pill and had some fragments and try to rub it into her gum and lip.  She also took a just after taking a famotidine tablet.  She developed intense tingling around the mouth.  She been concerned, called the nurse line and the nurse was worried about possible acute neurological symptom.  This caused more anxiety and panic.  She is not taken the medicine since.  She never developed shortness of breath or difficulty breathing or swelling in the face or tongue or cheek.    Has taken escitalopram on 2 different patients for least several months at a time without any prior side effects or difficulties.      **I reviewed the information recorded in the patient's EPIC chart (including but not limited to medical history, surgical history, family history, problem list, medication list, and allergy list) and updated the information as indicated based on the patients reported information.         Review of Systems   Constitutional, HEENT, cardiovascular, pulmonary, gi and gu systems are negative, except as otherwise noted.      Objective           Vitals:  No vitals were obtained today due to virtual visit.    Physical Exam   GENERAL: Healthy, alert and no distress  EYES: Eyes grossly normal to inspection.  No discharge or erythema, or obvious scleral/conjunctival abnormalities.  RESP: No audible wheeze, cough, or visible cyanosis.  No visible retractions or increased work of breathing.    SKIN: Visible skin clear. No significant rash, abnormal pigmentation or lesions.  NEURO: Cranial nerves grossly intact.  Mentation and speech appropriate for  age.  PSYCH: Mentation appears normal, affect normal/bright, judgement and insight intact, normal speech and appearance well-groomed.                Video-Visit Details    Type of service:  Video Visit    Video Start Time: 3:15PM  Video End Time:3:28 PM    Originating Location (pt. Location): Home    Distant Location (provider location):  [facility]     Platform used for Video Visit: [employer]

## 2021-04-10 ASSESSMENT — ANXIETY QUESTIONNAIRES: GAD7 TOTAL SCORE: 19

## 2021-05-11 ENCOUNTER — VIRTUAL VISIT (OUTPATIENT)
Dept: INTERNAL MEDICINE | Facility: CLINIC | Age: 36
End: 2021-05-11
Payer: COMMERCIAL

## 2021-05-11 DIAGNOSIS — F41.1 GENERALIZED ANXIETY DISORDER: ICD-10-CM

## 2021-05-11 PROCEDURE — 99213 OFFICE O/P EST LOW 20 MIN: CPT | Mod: GT | Performed by: INTERNAL MEDICINE

## 2021-05-11 RX ORDER — ESCITALOPRAM OXALATE 10 MG/1
10 TABLET ORAL DAILY
Qty: 90 TABLET | Refills: 0 | Status: SHIPPED | OUTPATIENT
Start: 2021-05-11 | End: 2021-06-07

## 2021-05-11 ASSESSMENT — ANXIETY QUESTIONNAIRES
3. WORRYING TOO MUCH ABOUT DIFFERENT THINGS: SEVERAL DAYS
5. BEING SO RESTLESS THAT IT IS HARD TO SIT STILL: NOT AT ALL
IF YOU CHECKED OFF ANY PROBLEMS ON THIS QUESTIONNAIRE, HOW DIFFICULT HAVE THESE PROBLEMS MADE IT FOR YOU TO DO YOUR WORK, TAKE CARE OF THINGS AT HOME, OR GET ALONG WITH OTHER PEOPLE: NOT DIFFICULT AT ALL
6. BECOMING EASILY ANNOYED OR IRRITABLE: NOT AT ALL
GAD7 TOTAL SCORE: 5
2. NOT BEING ABLE TO STOP OR CONTROL WORRYING: SEVERAL DAYS
1. FEELING NERVOUS, ANXIOUS, OR ON EDGE: SEVERAL DAYS
7. FEELING AFRAID AS IF SOMETHING AWFUL MIGHT HAPPEN: SEVERAL DAYS

## 2021-05-11 ASSESSMENT — PATIENT HEALTH QUESTIONNAIRE - PHQ9: 5. POOR APPETITE OR OVEREATING: SEVERAL DAYS

## 2021-05-11 NOTE — PROGRESS NOTES
Mignon is a 36 year old who is being evaluated via a billable video visit.      How would you like to obtain your AVS? Peace  If the video visit is dropped, the invitation should be resent by: Peace  Will anyone else be joining your video visit? No        Assessment & Plan     (F41.1) Generalized anxiety disorder  Comment: doing much better  Prior side effects have abated, tolerating med well without side effects   Feels more like herself.   Does not feel additioanl meds indiacted  workign with therapist once weekly, they feel she is doing well  Will be changing to every 2 weeks.   Contineulexpaor 10 mg once per day, RX sent    Follow up in 3 month approx via video visit for another follow up, earlier if needed.     If additiaonl anxiety treatment needed, would consider buspar or valproic acid.     Plan: escitalopram (LEXAPRO) 10 MG tablet                            No follow-ups on file.    Morro Meyer MD  [facility]    Baljeet Crow is a 36 year old who presents for the following health issues     HPI     Anxiety Follow-Up    How are you doing with your anxiety since your last visit? Improved with medications     Are you having other symptoms that might be associated with anxiety? Yes:  panic attacks     Have you had a significant life event? No     Are you feeling depressed? No    Do you have any concerns with your use of alcohol or other drugs? No    Social History     Tobacco Use     Smoking status: Former Smoker     Types: Cigarettes     Smokeless tobacco: Never Used   Substance Use Topics     Alcohol use: Yes     Comment: very rare-1-2x year     Drug use: Yes     Types: Marijuana     Comment: occasionally     LAURA-7 SCORE 4/9/2021   Total Score 19     No flowsheet data found.  No flowsheet data found.  LAURA-7  5/11/2021   1. Feeling nervous, anxious, or on edge 1   2. Not being able to stop or control worrying 1   3. Worrying too much about different  things 1   4. Trouble relaxing 1   5. Being so restless that it is hard to sit still 0   6. Becoming easily annoyed or irritable 0   7. Feeling afraid, as if something awful might happen 1   LAURA-7 Total Score 5   If you checked any problems, how difficult have they made it for you to do your work, take care of things at home, or get along with other people? Not difficult at all         **I reviewed the information recorded in the patient's EPIC chart (including but not limited to medical history, surgical history, family history, problem list, medication list, and allergy list) and updated the information as indicated based on the patients reported information.         Review of Systems   Constitutional, HEENT, cardiovascular, pulmonary, gi and gu systems are negative, except as otherwise noted.      Objective           Vitals:  No vitals were obtained today due to virtual visit.    Physical Exam   GENERAL: Healthy, alert and no distress  EYES: Eyes grossly normal to inspection.  No discharge or erythema, or obvious scleral/conjunctival abnormalities.  RESP: No audible wheeze, cough, or visible cyanosis.  No visible retractions or increased work of breathing.    SKIN: Visible skin clear. No significant rash, abnormal pigmentation or lesions.  NEURO: Cranial nerves grossly intact.  Mentation and speech appropriate for age.  PSYCH: Mentation appears normal, affect normal/bright, judgement and insight intact, normal speech and appearance well-groomed.                Video-Visit Details    Type of service:  Video Visit    Video End Time:  1st VideoStart: 05/11/2021 01:36 pm  Stop: 05/11/2021 01:58 pm    Originating Location (pt. Location): Home    Distant Location (provider location):  [facility]     Platform used for Video Visit: [email or web address]

## 2021-05-12 ASSESSMENT — ANXIETY QUESTIONNAIRES: GAD7 TOTAL SCORE: 5

## 2021-06-07 DIAGNOSIS — F41.1 GENERALIZED ANXIETY DISORDER: ICD-10-CM

## 2021-06-07 RX ORDER — ESCITALOPRAM OXALATE 10 MG/1
10 TABLET ORAL DAILY
Qty: 90 TABLET | Refills: 0 | Status: SHIPPED | OUTPATIENT
Start: 2021-06-07 | End: 2021-09-22

## 2021-06-07 NOTE — PROGRESS NOTES
Prescription approved per [facility] Refill Protocol.  Sent rx to a different pharmacy per patient's request     Hesham Barros RN  [facility]

## 2021-07-13 NOTE — PATIENT INSTRUCTIONS
*  I am glad that you are doing better.     *  Stay on the Escitalopram 10 mg once per day.     *  COnitnue to work with your therapist, this is very helpful in managing anxiety.     *  Follow up with me in approximately 3 months via virtual video visit, sooner if needed.     *  I strongly recommend that you receive the Covid vaccine, this is very important in preventing covid in our community, but more importantly in preventing you from Covid infection.    Schedule a Covid vaccine appointment through [facility].    Patient is a 58y old  Female who presents with a chief complaint of Shortness of Breath, Wheezing (12 Jul 2021 17:09)      Subective:  Feels a little better. Still poor flatus and stool output    PAST MEDICAL & SURGICAL HISTORY:  Sigmoid Volvulus  1985    Neurogenic Bladder    Chronic Low Back Pain    Hx MRSA Infection  treated now none    Manic Depression    Empyema    Renal Abscess    Afib  s/p ablation/Resolved    Chronic obstructive pulmonary disease (COPD)  Asthma on Symbicort, 2L O2 at night    CHF (congestive heart failure)  last echo 7/1/19, EF 60-65%    Peripheral Neuropathy    Narcolepsy    Recurrent urinary tract infection    GI bleed  s/p transfusion 9/12    Adrenal insufficiency    Duodenal ulcer  hx of bleeding in past    Hypothyroid  on Synthroid    Irritable bowel syndrome (IBS)    Hypoglycemia    Orthostatic hypotension    GERD (gastroesophageal reflux disease)    Salmonella infection  history of    Clostridium Difficile Infection  1999    Endometriosis    PCOS (polycystic ovarian syndrome)    Anemia  IV Iron    Hypogammaglobulinemia  treate with gamma globulin    Migraine headache    Seroma  abdominal wall and buttock    Spinal stenosis  s/p epidural injection 4/12    Septic embolism  4/08    Hyponatremia    Hypokalemia    Hypomagnesemia    Postgastric surgery syndrome    Schizoaffective disorder, unspecified type    Lymphedema  both lower legs  used ready wraps    Torn rotator cuff    Encounter for insertion of venous access port  Rt chest wall Mediport    Aspiration pneumonia  July &#x27;19- hospitalized and treated    Suprapubic catheter  2/2 neurogenic bladder    Migraine    Congestive heart failure    Anxiety    IBS (irritable bowel syndrome)    OA (osteoarthritis)    Spinal stenosis, lumbar    Spondylolisthesis, lumbar region    H/O slipped capital femoral epiphysis (SCFE)    Sleep apnea  history of/Resolved    Gastric Bypass Status for Obesity  s/p gastric bypass 2002 275lb weight loss    left corneal transplant    S/P Cholecystectomy    hiatal hernia repair  surgical repair 7/11    B/l hip surgery for subcapital femoral epiphysis    Bladder suspension    History of arthroscopy of knee  right    History of colonoscopy    Ventral hernia  2003 surgical repair and lysis of adhesions    H/O abdominal hysterectomy  left salpingo oophorectomy 2002    Corneal abnormality  s/p left corneal transplant 1985    History of colon resection  1986    SCFE (slipped capital femoral epiphysis)  bilateral pinning 1974, pins removed    Lung abnormality  septic emboli 4/08, right lower lobe procedure and thoracentesis    S/P knee replacement  bilateral    S/P ablation of atrial fibrillation    Suprapubic catheter    H/O kyphoplasty    S/P total knee replacement  right 2015, left 2016    History of other surgery  hernia repair        MEDICATIONS  (STANDING):  budesonide 160 MICROgram(s)/formoterol 4.5 MICROgram(s) Inhaler 2 Puff(s) Inhalation two times a day  dexlansoprazole DR 60 milliGRAM(s) Oral <User Schedule>  diazepam    Tablet 5 milliGRAM(s) Oral two times a day  diazepam    Tablet 2 milliGRAM(s) Oral at bedtime  DULoxetine 60 milliGRAM(s) Oral daily  enoxaparin Injectable 40 milliGRAM(s) SubCutaneous daily  Ingrezza capsule 80 milliGRAM(s) 80 milliGRAM(s) Oral daily  lamoTRIgine 100 milliGRAM(s) Oral at bedtime  lamoTRIgine 200 milliGRAM(s) Oral daily  levothyroxine 50 MICROGram(s) Oral daily  linaclotide 290 MICROGram(s) Oral before breakfast  lubiprostone 24 MICROGram(s) Oral two times a day  methenamine hippurate 1 Gram(s) Oral two times a day  methylPREDNISolone sodium succinate Injectable 40 milliGRAM(s) IV Push two times a day  metoprolol succinate ER 25 milliGRAM(s) Oral daily  mirabegron ER 50 milliGRAM(s) Oral daily  misoprostol 200 MICROGram(s) Oral <User Schedule>  montelukast 10 milliGRAM(s) Oral daily  polyethylene glycol 3350 17 Gram(s) Oral <User Schedule>  QUEtiapine 150 milliGRAM(s) Oral at bedtime  tamsulosin 0.4 milliGRAM(s) Oral at bedtime  tiotropium 18 MICROgram(s) Capsule 1 Capsule(s) Inhalation daily    MEDICATIONS  (PRN):  acetaminophen   Tablet .. 650 milliGRAM(s) Oral every 6 hours PRN Temp greater or equal to 38C (100.4F), Mild Pain (1 - 3)  ALBUTerol    90 MICROgram(s) HFA Inhaler 2 Puff(s) Inhalation every 6 hours PRN Bronchospasm  ketorolac   Injectable 15 milliGRAM(s) IV Push every 8 hours PRN Moderate Pain (4 - 6)  ondansetron Injectable 4 milliGRAM(s) IV Push every 6 hours PRN Nausea  QUEtiapine 25 milliGRAM(s) Oral three times a day PRN for anxiety  senna 2 Tablet(s) Oral at bedtime PRN Constipation      REVIEW OF SYSTEMS:    RESPIRATORY: No shortness of breath  CARDIOVASCULAR: No chest pain  All other review of systems is negative unless indicated above.    Vital Signs Last 24 Hrs  T(C): 36.6 (13 Jul 2021 08:37), Max: 37 (12 Jul 2021 16:52)  T(F): 97.8 (13 Jul 2021 08:37), Max: 98.6 (12 Jul 2021 16:52)  HR: 76 (13 Jul 2021 08:52) (62 - 77)  BP: 148/103 (13 Jul 2021 08:37) (105/71 - 148/103)  BP(mean): --  RR: 18 (13 Jul 2021 08:37) (18 - 18)  SpO2: 98% (13 Jul 2021 08:52) (95% - 98%)    PHYSICAL EXAM:    Constitutional: NAD, well-developed  Respiratory: CTAB  Cardiovascular: S1 and S2, RRR  Gastrointestinal: BS+, soft, moderately distended  Extremities: No peripheral edema  Psychiatric: Normal mood, normal affect    LABS:                        13.2   6.65  )-----------( 131      ( 13 Jul 2021 09:21 )             40.9     07-13    129<L>  |  97  |  12  ----------------------------<  194<H>  5.1   |  25  |  0.77    Ca    8.7      13 Jul 2021 09:21  Phos  2.9     07-12  Mg     2.6     07-12            RADIOLOGY & ADDITIONAL STUDIES:

## 2021-09-22 DIAGNOSIS — F41.1 GENERALIZED ANXIETY DISORDER: ICD-10-CM

## 2021-09-22 RX ORDER — ESCITALOPRAM OXALATE 10 MG/1
10 TABLET ORAL DAILY
Qty: 90 TABLET | Refills: 0 | Status: SHIPPED | OUTPATIENT
Start: 2021-09-22 | End: 2021-12-20

## 2021-10-03 ENCOUNTER — HEALTH MAINTENANCE LETTER (OUTPATIENT)
Age: 36
End: 2021-10-03

## 2021-12-16 DIAGNOSIS — F41.1 GENERALIZED ANXIETY DISORDER: ICD-10-CM

## 2021-12-20 RX ORDER — ESCITALOPRAM OXALATE 10 MG/1
TABLET ORAL
Qty: 90 TABLET | Refills: 1 | Status: SHIPPED | OUTPATIENT
Start: 2021-12-20 | End: 2023-02-23

## 2022-04-22 LAB
ABO (EXTERNAL): NORMAL
HEPATITIS B SURFACE ANTIGEN (EXTERNAL): NEGATIVE
HIV1+2 AB SERPL QL IA: NEGATIVE
RH (EXTERNAL): POSITIVE
RUBELLA ANTIBODY IGG (EXTERNAL): NORMAL
VDRL (SYPHILIS) (EXTERNAL): NONREACTIVE

## 2022-05-02 ENCOUNTER — APPOINTMENT (OUTPATIENT)
Dept: RADIOLOGY | Facility: CLINIC | Age: 37
End: 2022-05-02
Payer: COMMERCIAL

## 2022-05-02 ENCOUNTER — HOSPITAL ENCOUNTER (EMERGENCY)
Facility: CLINIC | Age: 37
Discharge: HOME | End: 2022-05-02
Admitting: PHYSICIAN ASSISTANT
Payer: COMMERCIAL

## 2022-05-02 VITALS
BODY MASS INDEX: 17.48 KG/M2 | SYSTOLIC BLOOD PRESSURE: 97 MMHG | RESPIRATION RATE: 20 BRPM | WEIGHT: 118 LBS | OXYGEN SATURATION: 100 % | HEART RATE: 75 BPM | DIASTOLIC BLOOD PRESSURE: 54 MMHG | TEMPERATURE: 97.9 F | HEIGHT: 69 IN

## 2022-05-02 DIAGNOSIS — R00.2 PALPITATIONS: ICD-10-CM

## 2022-05-02 LAB
ANION GAP SERPL CALCULATED.3IONS-SCNC: 13 MMOL/L (ref 5–18)
BASOPHILS # BLD AUTO: 0.1 10E3/UL (ref 0–0.2)
BASOPHILS NFR BLD AUTO: 1 %
BUN SERPL-MCNC: 10 MG/DL (ref 8–22)
CALCIUM SERPL-MCNC: 9.8 MG/DL (ref 8.5–10.5)
CHLORIDE BLD-SCNC: 104 MMOL/L (ref 98–107)
CO2 SERPL-SCNC: 21 MMOL/L (ref 22–31)
CREAT SERPL-MCNC: 0.63 MG/DL (ref 0.6–1.1)
EOSINOPHIL # BLD AUTO: 0.1 10E3/UL (ref 0–0.7)
EOSINOPHIL NFR BLD AUTO: 1 %
ERYTHROCYTE [DISTWIDTH] IN BLOOD BY AUTOMATED COUNT: 13.2 % (ref 10–15)
GFR SERPL CREATININE-BSD FRML MDRD: >90 ML/MIN/1.73M2
GLUCOSE BLD-MCNC: 77 MG/DL (ref 70–125)
HCT VFR BLD AUTO: 41.1 % (ref 35–47)
HGB BLD-MCNC: 14 G/DL (ref 11.7–15.7)
HOLD SPECIMEN: NORMAL
IMM GRANULOCYTES # BLD: 0.1 10E3/UL
IMM GRANULOCYTES NFR BLD: 1 %
LYMPHOCYTES # BLD AUTO: 1.4 10E3/UL (ref 0.8–5.3)
LYMPHOCYTES NFR BLD AUTO: 16 %
MAGNESIUM SERPL-MCNC: 1.9 MG/DL (ref 1.8–2.6)
MCH RBC QN AUTO: 32.4 PG (ref 26.5–33)
MCHC RBC AUTO-ENTMCNC: 34.1 G/DL (ref 31.5–36.5)
MCV RBC AUTO: 95 FL (ref 78–100)
MONOCYTES # BLD AUTO: 0.7 10E3/UL (ref 0–1.3)
MONOCYTES NFR BLD AUTO: 8 %
NEUTROPHILS # BLD AUTO: 6.4 10E3/UL (ref 1.6–8.3)
NEUTROPHILS NFR BLD AUTO: 73 %
NRBC # BLD AUTO: 0 10E3/UL
NRBC BLD AUTO-RTO: 0 /100
PLATELET # BLD AUTO: 345 10E3/UL (ref 150–450)
POTASSIUM BLD-SCNC: 3.8 MMOL/L (ref 3.5–5)
RBC # BLD AUTO: 4.32 10E6/UL (ref 3.8–5.2)
SODIUM SERPL-SCNC: 138 MMOL/L (ref 136–145)
TSH SERPL DL<=0.005 MIU/L-ACNC: 1.53 UIU/ML (ref 0.3–5)
WBC # BLD AUTO: 8.7 10E3/UL (ref 4–11)

## 2022-05-02 PROCEDURE — 99285 EMERGENCY DEPT VISIT HI MDM: CPT | Mod: 25

## 2022-05-02 PROCEDURE — 82374 ASSAY BLOOD CARBON DIOXIDE: CPT | Performed by: EMERGENCY MEDICINE

## 2022-05-02 PROCEDURE — 85025 COMPLETE CBC W/AUTO DIFF WBC: CPT | Performed by: EMERGENCY MEDICINE

## 2022-05-02 PROCEDURE — 83735 ASSAY OF MAGNESIUM: CPT | Performed by: EMERGENCY MEDICINE

## 2022-05-02 PROCEDURE — 36415 COLL VENOUS BLD VENIPUNCTURE: CPT | Performed by: EMERGENCY MEDICINE

## 2022-05-02 PROCEDURE — 96360 HYDRATION IV INFUSION INIT: CPT

## 2022-05-02 PROCEDURE — 71046 X-RAY EXAM CHEST 2 VIEWS: CPT

## 2022-05-02 PROCEDURE — 258N000003 HC RX IP 258 OP 636: Performed by: PHYSICIAN ASSISTANT

## 2022-05-02 PROCEDURE — 82435 ASSAY OF BLOOD CHLORIDE: CPT | Performed by: EMERGENCY MEDICINE

## 2022-05-02 PROCEDURE — 93005 ELECTROCARDIOGRAM TRACING: CPT | Performed by: EMERGENCY MEDICINE

## 2022-05-02 PROCEDURE — 84443 ASSAY THYROID STIM HORMONE: CPT | Performed by: EMERGENCY MEDICINE

## 2022-05-02 RX ADMIN — SODIUM CHLORIDE 1000 ML: 9 INJECTION, SOLUTION INTRAVENOUS at 17:02

## 2022-05-02 ASSESSMENT — ENCOUNTER SYMPTOMS
DIARRHEA: 0
ABDOMINAL PAIN: 0
NAUSEA: 1
NERVOUS/ANXIOUS: 0
FEVER: 0
CHILLS: 0
FREQUENCY: 0
LIGHT-HEADEDNESS: 1
COUGH: 0
PALPITATIONS: 1
HEMATURIA: 0
SHORTNESS OF BREATH: 1
DYSURIA: 0
SORE THROAT: 0
HEADACHES: 0
VOMITING: 1
MYALGIAS: 0

## 2022-05-02 NOTE — ED PROVIDER NOTES
Emergency Department Encounter   NAME: Mignon Treviño ; AGE: 37 year old female ; YOB: 1985 ; MRN: [id number] ; EVALUATION DATE & TIME: 2022  4:31 PM ; PCP: Da, [facility]   ED PROVIDER: Negin Acosta PA-C    Chief Complaint   Patient presents with     Palpitations       Medical Decision Making & Final Diagnosis     1. Palpitations         ED Course as of 05/02/22 2005   Mon May 02, 2022   1711 Mignon is a 37 year old  female currently approx 10 wk pg with a relevant PMH of anxiety who presents to the ED for evaluation of palpitations.    My exam is notable for /80 and otherwise normal vital signs and a nontoxic appearing woman.  Normal heart and lung sounds.  No peripheral edema or TTP to BLEs.    EKG reveals sinus rhythm.  No acute ST elevation or depression.  No pathologic arrhythmia.  Labs reassuring and without significant electrolyte or metabolic derangement.  No severe anemia.  TSH and magnesium normal.  Patient has no chest pain.  No risk factors for ACS.  Do not believe there is indication to obtain troponin this patient presenting with self-limited shortness of breath and palpitations.  Chest x-ray ordered given patient report of spitting out some blood after emesis for the last several weeks.  Chest x-ray is clear without acute abnormality.    I am most suspicious for PACs or PVCs as source of patient's symptoms with comorbid anxiety.  Increased blood volume in the context of pregnancy may contribute to this.  I am unable to entirely rule out cardiac arrhythmia with work-up.  She was asymptomatic by the time I evaluated her.  We will plan to refer to rapid access clinic for possibility of Holter monitor however it outplace there is indication for observation admission given her age, comorbidities, and resolution of symptoms here.  She did have some shortness of breath with this episode but notes it was very similar to prior episodes of panic  "attacks.  She has not had any persistent shortness of breath, pleuritic chest pain, or syncope.  Nothing to suggest DVT on exam.  Overall low risk for PE with the exception of the fact that she is pregnant but the palpitation she experienced today does not sound consistent with PE.  She has not been tachycardic or tachypneic here.  No hypoxia.  Patient symptoms resolved without intervention here.  She was given a small fluid bolus given her report of significant nausea and vomiting.  Vital signs remained normal throughout her stay.  We discussed symptomatic measures for home, return precautions, need for follow-up with rapid access clinic and primary care.            ED Course   4:39 PM I met and introduced myself to the patient. I gathered initial history and performed my physical exam. We discussed plan for initial workup.   I did see the patient while wearing full COVID-compliant PPE.  5:55 PM Discussed discahrge and follow up. Reviewed return precautions. Pt tolerate ambulation trial prior to discharge      MEDICATIONS GIVEN IN THE EMERGENCY:   Medications   0.9% sodium chloride BOLUS (0 mLs Intravenous Stopped 22)      NEW PRESCRIPTIONS STARTED AT TODAY'S ER VISIT:  Discharge Medication List as of 2022  6:04 PM        =================================================================   History   Patient information was obtained from: patient   Use of Intrepreter: N/A    Mignon Treviño is a 37 year old  female currently approx 10 wk pg with a relevant PMH of anxiety who presents to the ED for evaluation of palpitaions.   Pt reports when she woke up this morning around 8:50a she was feeling short of breath and felt some pressure on the left side of her chest. This resolved as she started to work. Around 2p she began to feel a \"butterfly\" feeling in her chest associated with some chest pressure and feeling like her heart was skipping beats. She became really anxious with this and began to feel " lightheaded, was hyperventilating, and had some tingling in her fingers consistent with prior panic attacks. She stopped her SSRI when she found out she was pregnant. She does have a benzodiazepine prescribed for anxiety attacks but did not want to take this because she was pregnant.  She reports at the time of my history taking she is feeling much better and has none of the symptoms.  She has had nausea and vomiting throughout her entire pregnancy which is slightly worsened in the last week as she has been taking metronidazole for BV.  She finished the course of this.  She denies recent fever, chills, sore throat, cough, headache, vision changes, chest pain, abdominal pain, vaginal bleeding, urinary symptoms, numbness or swelling in arms or legs.  No history of VTE, recent surgery immobilization, cancer diagnosis or treatment.  She has intermittently had streaks of blood when she spits after vomiting over the last few weeks.  ______________________________________________________________________  Past Medical History:   Diagnosis Date     Abnormal Pap smear of cervix 07/24/2013     Cervical high risk HPV (human papillomavirus) test positive 07/24/2013     Gastroesophageal reflux disease without esophagitis      Generalized anxiety disorder 2010     Hiatal hernia 04/02/2021     History of colposcopy 08/31/2013     Panic attack 2010     Vitamin D deficiency 03/20/2021        No past surgical history on file.    Family History   Problem Relation Age of Onset     No Known Problems Mother      Diabetes Type 2  Father      No Known Problems Sister      No Known Problems Brother      No Known Problems Brother      No Known Problems Sister      Breast Cancer Cousin 29     Colon Cancer No family hx of      Coronary Artery Disease Early Onset No family hx of        Social History     Tobacco Use     Smoking status: Former Smoker     Types: Cigarettes     Smokeless tobacco: Never Used   Substance Use Topics     Alcohol use:  "Yes     Comment: very rare-1-2x year     Drug use: Yes     Types: Marijuana     Comment: occasionally       REVIEW OF SYSTEMS:    Review of Systems   Constitutional: Negative for chills and fever.   HENT: Negative for sore throat.    Eyes: Negative for visual disturbance.   Respiratory: Positive for shortness of breath. Negative for cough.    Cardiovascular: Positive for palpitations. Negative for chest pain and leg swelling.   Gastrointestinal: Positive for nausea and vomiting. Negative for abdominal pain and diarrhea.   Genitourinary: Negative for dysuria, frequency, hematuria and urgency.   Musculoskeletal: Negative for myalgias.   Skin: Negative for rash.   Neurological: Positive for light-headedness. Negative for headaches.   Psychiatric/Behavioral: The patient is not nervous/anxious.    All other systems reviewed and are negative.        Physical Exam   BP 97/54   Pulse 75   Temp 97.9  F (36.6  C) (Oral)   Resp 20   Ht 1.753 m (5' 9\")   Wt 53.5 kg (118 lb)   SpO2 100%   BMI 17.43 kg/m      Physical Exam  Constitutional:       General: She is not in acute distress.     Appearance: Normal appearance.   HENT:      Head: Normocephalic.   Eyes:      Conjunctiva/sclera: Conjunctivae normal.   Cardiovascular:      Rate and Rhythm: Normal rate and regular rhythm.      Heart sounds: Normal heart sounds.   Pulmonary:      Effort: Pulmonary effort is normal. No respiratory distress.      Breath sounds: Normal breath sounds. No wheezing, rhonchi or rales.   Abdominal:      General: There is no distension.      Palpations: Abdomen is soft.      Tenderness: There is no abdominal tenderness. There is no guarding or rebound.   Musculoskeletal:         General: No swelling or deformity. Normal range of motion.      Cervical back: Normal range of motion.      Right lower leg: No edema.      Left lower leg: No edema.      Comments: No TTP to BLEs   Skin:     General: Skin is warm.   Neurological:      General: No focal " deficit present.      Mental Status: She is alert.   Psychiatric:         Mood and Affect: Mood normal.         Lab Work (Reviewed and Interpreted):   Labs Ordered and Resulted from Time of ED Arrival to Time of ED Departure   BASIC METABOLIC PANEL - Abnormal       Result Value    Sodium 138      Potassium 3.8      Chloride 104      Carbon Dioxide (CO2) 21 (*)     Anion Gap 13      Urea Nitrogen 10      Creatinine 0.63      Calcium 9.8      Glucose 77      GFR Estimate >90     TSH WITH FREE T4 REFLEX - Normal    TSH 1.53     MAGNESIUM - Normal    Magnesium 1.9     CBC WITH PLATELETS AND DIFFERENTIAL    WBC Count 8.7      RBC Count 4.32      Hemoglobin 14.0      Hematocrit 41.1      MCV 95      MCH 32.4      MCHC 34.1      RDW 13.2      Platelet Count 345      % Neutrophils 73      % Lymphocytes 16      % Monocytes 8      % Eosinophils 1      % Basophils 1      % Immature Granulocytes 1      NRBCs per 100 WBC 0      Absolute Neutrophils 6.4      Absolute Lymphocytes 1.4      Absolute Monocytes 0.7      Absolute Eosinophils 0.1      Absolute Basophils 0.1      Absolute Immature Granulocytes 0.1      Absolute NRBCs 0.0         Imaging (Reviewed and Interpreted):   Chest XR,  PA & LAT   Final Result   IMPRESSION: Lungs are clear. No pleural effusion. Heart size and pulmonary vascularity within normal limits.          EKG (Reviewed and Interpreted):   Sinus rhythm  No pathologic arrhythmia or ischemic changes.  No change from baseline EKG  EKG results reviewed and interpreted by Dr. Davila, ED MD.     Negin Acosta PA-C   Emergency Medicine   [facility] EMERGENCY ROOM  [address] [id number]  [phone]  Dept: [phone]        Negin Acosta PA-C  05/02/22 2006

## 2022-05-02 NOTE — Clinical Note
Mignon Treviño was seen and treated in our emergency department on 5/2/2022.  She may return to work on 05/03/2022.       If you have any questions or concerns, please don't hesitate to call.      Negin Acosta PA-C

## 2022-05-02 NOTE — ED TRIAGE NOTES
"Pt reports she woke this AM with palpitations and SOB which seemed to improve. States symptoms have returned and persisted throughout the afternoon. States feels like \"butterflies\" in her chest. Pt also reports she is approximately 10 weeks pregnant.      "

## 2022-05-02 NOTE — DISCHARGE INSTRUCTIONS
You were seen in the emergency department for palpitations, feeling short of breath, and lightheaded. At this time, your blood work and imaging are very reassuring for no life threatening or emergent source of your symptoms. I suspect they are from  premature ventricular or atrial contractions but it is hard to say for sure given the fact that you did not have symptoms while we were testing here.    Please make an appointment with rapid access cardiology clinic to discuss further. You may benefit from a holter monitor to monitor your heart function for a few days    Take care of yourself at home.    Drink lots of water    For pain or fever you may take:  - Tylenol 650 mg every 6 hours. Max 4000 mg in 24 hours.   - Please do not take this medication with alcohol as it can cause problems with your liver.     Please follow up with [facility] clinic to ensure your symptoms are improving.    Return to the emergency department if:  - You develop chest pain or difficulty breathing  - Your symptoms worsen  - you faint  - you feel your heart beating irregularly and fast  - Or with any other concerning symptom

## 2022-05-03 LAB
ATRIAL RATE - MUSE: 71 BPM
DIASTOLIC BLOOD PRESSURE - MUSE: NORMAL MMHG
INTERPRETATION ECG - MUSE: NORMAL
P AXIS - MUSE: 70 DEGREES
PR INTERVAL - MUSE: 148 MS
QRS DURATION - MUSE: 80 MS
QT - MUSE: 380 MS
QTC - MUSE: 412 MS
R AXIS - MUSE: 99 DEGREES
SYSTOLIC BLOOD PRESSURE - MUSE: NORMAL MMHG
T AXIS - MUSE: 65 DEGREES
VENTRICULAR RATE- MUSE: 71 BPM

## 2022-05-15 ENCOUNTER — HEALTH MAINTENANCE LETTER (OUTPATIENT)
Age: 37
End: 2022-05-15

## 2022-09-10 ENCOUNTER — HEALTH MAINTENANCE LETTER (OUTPATIENT)
Age: 37
End: 2022-09-10

## 2022-11-07 LAB — GROUP B STREPTOCOCCUS (EXTERNAL): POSITIVE

## 2022-11-23 ENCOUNTER — HOSPITAL ENCOUNTER (OUTPATIENT)
Facility: CLINIC | Age: 37
Discharge: HOME | End: 2022-11-23
Attending: ADVANCED PRACTICE MIDWIFE | Admitting: ADVANCED PRACTICE MIDWIFE
Payer: COMMERCIAL

## 2022-11-23 ENCOUNTER — HOSPITAL ENCOUNTER (OUTPATIENT)
Facility: CLINIC | Age: 37
End: 2022-11-23
Admitting: ADVANCED PRACTICE MIDWIFE
Payer: COMMERCIAL

## 2022-11-23 VITALS
DIASTOLIC BLOOD PRESSURE: 80 MMHG | HEIGHT: 69 IN | RESPIRATION RATE: 16 BRPM | WEIGHT: 180 LBS | HEART RATE: 100 BPM | BODY MASS INDEX: 26.66 KG/M2 | TEMPERATURE: 98.3 F | SYSTOLIC BLOOD PRESSURE: 134 MMHG

## 2022-11-23 LAB
CLUE CELLS: ABNORMAL
CRYSTALS AMN MICRO: NORMAL
RUPTURE OF FETAL MEMBRANES BY ROM PLUS: NEGATIVE
TRICHOMONAS, WET PREP: ABNORMAL
WBC'S/HIGH POWER FIELD, WET PREP: ABNORMAL
YEAST, WET PREP: ABNORMAL

## 2022-11-23 PROCEDURE — G0463 HOSPITAL OUTPT CLINIC VISIT: HCPCS

## 2022-11-23 PROCEDURE — 84112 EVAL AMNIOTIC FLUID PROTEIN: CPT | Performed by: ADVANCED PRACTICE MIDWIFE

## 2022-11-23 PROCEDURE — 87210 SMEAR WET MOUNT SALINE/INK: CPT | Performed by: ADVANCED PRACTICE MIDWIFE

## 2022-11-23 RX ORDER — LIDOCAINE 40 MG/G
CREAM TOPICAL
Status: DISCONTINUED | OUTPATIENT
Start: 2022-11-23 | End: 2022-11-23 | Stop reason: HOSPADM

## 2022-11-23 RX ORDER — ASPIRIN 81 MG/1
81 TABLET, CHEWABLE ORAL DAILY
COMMUNITY
End: 2024-08-14

## 2022-11-23 RX ORDER — FERROUS SULFATE 325(65) MG
325 TABLET ORAL
COMMUNITY
End: 2024-08-14

## 2022-11-23 ASSESSMENT — ACTIVITIES OF DAILY LIVING (ADL)
ADLS_ACUITY_SCORE: 31
ADLS_ACUITY_SCORE: 31

## 2022-11-23 NOTE — PROGRESS NOTES
"Outpatient/Triage Note:    Patient Name:  Mignon Treviño  :      1985  MRN:      [id number]      Assessment:   @ 39w1d here for evaluation of leaking fluids since Saturday.     Plan:   -ROM+ negative , Fern neg  - Discharge to home undelivered. Reviewed warning signs including decreased fetal movement, leaking of fluid, vaginal bleeding, or signs of labor. Reviewed how to contact on-call provider. Follow-up in clinic with OB provider as scheduled or sooner as needed. All questions answered. Agrees with plan.     Subjective:  Mignon Treviño is a 37 year old  at 39 weeks, who presented to [facility] for evaluation of leaking fluids. Denies bleeding or changes in fetal movement.     Objective:  Vital signs: /80 (BP Location: Right arm, Patient Position: Semi-Jett's, Cuff Size: Adult Regular)   Pulse 100   Temp 98.3  F (36.8  C) (Oral)   Resp 16   Ht 1.753 m (5' 9\")   Wt 81.6 kg (180 lb)   BMI 26.58 kg/m    Reactive NST    Physical Exam: A&Ox3  Abdomen: SIUP, abdomen non-tender  SVE: deferred  Legs: no edema, moves freely      Results:  Recent Results (from the past 168 hour(s))   Rupture of Fetal Membranes by ROM Plus   Result Value Ref Range Status    Rupture of Fetal Membranes by ROM Plus Negative Negative, Invalid, Suggest Repeat Final   Wet preparation    Specimen: Vagina; Swab   Result Value Ref Range Status    Trichomonas Absent Absent Final    Yeast Absent Absent Final    Clue Cells Absent Absent Final    WBCs/high power field 3+ (A) None Final   Fern Test for Rupture of Membranes   Result Value Ref Range Status    Fern Crystallization No ferning present No ferning present Final         Provider: ONI Moore CNM  "

## 2022-11-23 NOTE — DISCHARGE INSTRUCTIONS
Discharge Instruction for Undelivered Patients      You were seen for: Membrane Assessment  We Consulted: Hannah Arevalo CNM  You had (Test or Medicine):ROM+, Wet prep and Fern test     Diet:   Drink 8 to 12 glasses of liquids (milk, juice, water) every day.     Activity:  Call your doctor or nurse midwife if your baby is moving less than usual.     Call your provider if you notice:  Swelling in your face or increased swelling in your hands or legs.  Headaches that are not relieved by Tylenol (acetaminophen).  Changes in your vision (blurring: seeing spots or stars.)  Nausea (sick to your stomach) and vomiting (throwing up).   Weight gain of 5 pounds or more per week.  Heartburn that doesn't go away.  Signs of bladder infection: pain when you urinate (use the toilet), need to go more often and more urgently.  The bag of reilly (rupture of membranes) breaks, or you notice leaking in your underwear.  Bright red blood in your underwear.  Abdominal (lower belly) or stomach pain.  For first baby: Contractions (tightening) less than 5 minutes apart for one hour or more.  Second (plus) baby: Contractions (tightening) less than 10 minutes apart and getting stronger.  *If less than 34 weeks: Contractions (tightening) more than 6 times in one hour.  Increase or change in vaginal discharge (note the color and amount)      Follow-up:  As scheduled in the clinic

## 2022-11-23 NOTE — PROGRESS NOTES
Pt arrived to [facility] for evaluation of ROM.. 39.1 wks. GBS positive per pt report. Pt brought to room , placed on EFM. FHT's reactive, category I. Uterus soft, non tender with 2 ctx's noted per EFM, moderate to palpation. ROM+ and wet prep collected, no fluid noted during collection. VSS. Will update provider with results. Pt states understanding.

## 2022-12-09 NOTE — TELEPHONE ENCOUNTER
New pharmacy refill request.     Eleanor Pizarro RN  [facility]     
Routing refill request to provider for review/approval because:  Drug not on the [facility] refill protocol per Epic.  Not sure if it recognizes the last office visit.     Eleanor Pizarro RN  [facility] Triage    
Tachycardia, Heart sounds S1 S2 present, no murmurs, rubs or gallops

## 2023-02-23 ENCOUNTER — HOSPITAL ENCOUNTER (EMERGENCY)
Facility: CLINIC | Age: 38
Discharge: HOME | End: 2023-02-23
Attending: STUDENT IN AN ORGANIZED HEALTH CARE EDUCATION/TRAINING PROGRAM | Admitting: STUDENT IN AN ORGANIZED HEALTH CARE EDUCATION/TRAINING PROGRAM
Payer: COMMERCIAL

## 2023-02-23 VITALS
SYSTOLIC BLOOD PRESSURE: 115 MMHG | WEIGHT: 140 LBS | RESPIRATION RATE: 18 BRPM | OXYGEN SATURATION: 99 % | BODY MASS INDEX: 20.67 KG/M2 | DIASTOLIC BLOOD PRESSURE: 67 MMHG | HEART RATE: 65 BPM | TEMPERATURE: 97 F

## 2023-02-23 DIAGNOSIS — R42 LIGHTHEADEDNESS: ICD-10-CM

## 2023-02-23 LAB
ALBUMIN SERPL-MCNC: 4.2 G/DL (ref 3.5–5)
ALP SERPL-CCNC: 45 U/L (ref 45–120)
ALT SERPL W P-5'-P-CCNC: 12 U/L (ref 0–45)
ANION GAP SERPL CALCULATED.3IONS-SCNC: 12 MMOL/L (ref 5–18)
AST SERPL W P-5'-P-CCNC: 15 U/L (ref 0–40)
ATRIAL RATE - MUSE: 60 BPM
BASOPHILS # BLD AUTO: 0 10E3/UL (ref 0–0.2)
BASOPHILS NFR BLD AUTO: 1 %
BILIRUB SERPL-MCNC: 0.4 MG/DL (ref 0–1)
BUN SERPL-MCNC: 11 MG/DL (ref 8–22)
CALCIUM SERPL-MCNC: 9.4 MG/DL (ref 8.5–10.5)
CHLORIDE BLD-SCNC: 107 MMOL/L (ref 98–107)
CO2 SERPL-SCNC: 19 MMOL/L (ref 22–31)
CREAT SERPL-MCNC: 0.65 MG/DL (ref 0.6–1.1)
DIASTOLIC BLOOD PRESSURE - MUSE: NORMAL MMHG
EOSINOPHIL # BLD AUTO: 0.1 10E3/UL (ref 0–0.7)
EOSINOPHIL NFR BLD AUTO: 3 %
ERYTHROCYTE [DISTWIDTH] IN BLOOD BY AUTOMATED COUNT: 15.6 % (ref 10–15)
GFR SERPL CREATININE-BSD FRML MDRD: >90 ML/MIN/1.73M2
GLUCOSE BLD-MCNC: 90 MG/DL (ref 70–125)
HCT VFR BLD AUTO: 39.9 % (ref 35–47)
HGB BLD-MCNC: 13.4 G/DL (ref 11.7–15.7)
IMM GRANULOCYTES # BLD: 0 10E3/UL
IMM GRANULOCYTES NFR BLD: 0 %
INTERPRETATION ECG - MUSE: NORMAL
LYMPHOCYTES # BLD AUTO: 1.5 10E3/UL (ref 0.8–5.3)
LYMPHOCYTES NFR BLD AUTO: 35 %
MAGNESIUM SERPL-MCNC: 1.8 MG/DL (ref 1.8–2.6)
MCH RBC QN AUTO: 29.8 PG (ref 26.5–33)
MCHC RBC AUTO-ENTMCNC: 33.6 G/DL (ref 31.5–36.5)
MCV RBC AUTO: 89 FL (ref 78–100)
MONOCYTES # BLD AUTO: 0.6 10E3/UL (ref 0–1.3)
MONOCYTES NFR BLD AUTO: 15 %
NEUTROPHILS # BLD AUTO: 1.9 10E3/UL (ref 1.6–8.3)
NEUTROPHILS NFR BLD AUTO: 46 %
NRBC # BLD AUTO: 0 10E3/UL
NRBC BLD AUTO-RTO: 0 /100
P AXIS - MUSE: 58 DEGREES
PLATELET # BLD AUTO: 268 10E3/UL (ref 150–450)
POTASSIUM BLD-SCNC: 3.4 MMOL/L (ref 3.5–5)
PR INTERVAL - MUSE: 180 MS
PROT SERPL-MCNC: 7.6 G/DL (ref 6–8)
QRS DURATION - MUSE: 86 MS
QT - MUSE: 428 MS
QTC - MUSE: 428 MS
R AXIS - MUSE: 91 DEGREES
RBC # BLD AUTO: 4.5 10E6/UL (ref 3.8–5.2)
SODIUM SERPL-SCNC: 138 MMOL/L (ref 136–145)
SYSTOLIC BLOOD PRESSURE - MUSE: NORMAL MMHG
T AXIS - MUSE: 57 DEGREES
TROPONIN I SERPL-MCNC: <0.01 NG/ML (ref 0–0.29)
VENTRICULAR RATE- MUSE: 60 BPM
WBC # BLD AUTO: 4.1 10E3/UL (ref 4–11)

## 2023-02-23 PROCEDURE — 84484 ASSAY OF TROPONIN QUANT: CPT | Performed by: STUDENT IN AN ORGANIZED HEALTH CARE EDUCATION/TRAINING PROGRAM

## 2023-02-23 PROCEDURE — 93005 ELECTROCARDIOGRAM TRACING: CPT | Performed by: STUDENT IN AN ORGANIZED HEALTH CARE EDUCATION/TRAINING PROGRAM

## 2023-02-23 PROCEDURE — 250N000013 HC RX MED GY IP 250 OP 250 PS 637: Performed by: STUDENT IN AN ORGANIZED HEALTH CARE EDUCATION/TRAINING PROGRAM

## 2023-02-23 PROCEDURE — 80053 COMPREHEN METABOLIC PANEL: CPT | Performed by: STUDENT IN AN ORGANIZED HEALTH CARE EDUCATION/TRAINING PROGRAM

## 2023-02-23 PROCEDURE — 96360 HYDRATION IV INFUSION INIT: CPT

## 2023-02-23 PROCEDURE — 85004 AUTOMATED DIFF WBC COUNT: CPT | Performed by: STUDENT IN AN ORGANIZED HEALTH CARE EDUCATION/TRAINING PROGRAM

## 2023-02-23 PROCEDURE — 99284 EMERGENCY DEPT VISIT MOD MDM: CPT | Mod: 25

## 2023-02-23 PROCEDURE — 83735 ASSAY OF MAGNESIUM: CPT | Performed by: STUDENT IN AN ORGANIZED HEALTH CARE EDUCATION/TRAINING PROGRAM

## 2023-02-23 PROCEDURE — 258N000003 HC RX IP 258 OP 636: Performed by: STUDENT IN AN ORGANIZED HEALTH CARE EDUCATION/TRAINING PROGRAM

## 2023-02-23 PROCEDURE — 36415 COLL VENOUS BLD VENIPUNCTURE: CPT | Performed by: STUDENT IN AN ORGANIZED HEALTH CARE EDUCATION/TRAINING PROGRAM

## 2023-02-23 RX ORDER — MECLIZINE HYDROCHLORIDE 25 MG/1
25 TABLET ORAL ONCE
Status: COMPLETED | OUTPATIENT
Start: 2023-02-23 | End: 2023-02-23

## 2023-02-23 RX ORDER — ESCITALOPRAM OXALATE 20 MG/1
20 TABLET ORAL DAILY
COMMUNITY

## 2023-02-23 RX ORDER — POTASSIUM CHLORIDE 1500 MG/1
40 TABLET, EXTENDED RELEASE ORAL ONCE
Status: COMPLETED | OUTPATIENT
Start: 2023-02-23 | End: 2023-02-23

## 2023-02-23 RX ORDER — MECLIZINE HYDROCHLORIDE 25 MG/1
25 TABLET ORAL 3 TIMES DAILY PRN
Qty: 15 TABLET | Refills: 0 | Status: SHIPPED | OUTPATIENT
Start: 2023-02-23 | End: 2024-08-14

## 2023-02-23 RX ADMIN — SODIUM CHLORIDE, POTASSIUM CHLORIDE, SODIUM LACTATE AND CALCIUM CHLORIDE 1000 ML: 600; 310; 30; 20 INJECTION, SOLUTION INTRAVENOUS at 05:29

## 2023-02-23 RX ADMIN — MECLIZINE HYDROCHLORIDE 25 MG: 25 TABLET ORAL at 05:19

## 2023-02-23 RX ADMIN — POTASSIUM CHLORIDE 40 MEQ: 1500 TABLET, EXTENDED RELEASE ORAL at 06:30

## 2023-02-23 ASSESSMENT — ACTIVITIES OF DAILY LIVING (ADL)
ADLS_ACUITY_SCORE: 35
ADLS_ACUITY_SCORE: 35

## 2023-02-23 NOTE — ED TRIAGE NOTES
"Here by [facility]   Woke up this morning feeling \"slow\" and dizzy, like on a boat. Recently got lexapro adjusted from 10mg to 20mg and patient believes it may be due to this, however is unsure.   History of anxiety, OCD, and panic attacks. Has had panic attacks in the past with dizziness but states this feels different.    Is 3 months post partum, denies SI/HI thoughts   NSR EKG per [facility]     Alert and oriented x 4     Triage Assessment     Row Name 02/23/23 0456       Triage Assessment (Adult)    Airway WDL WDL       Respiratory WDL    Respiratory WDL WDL       Skin Circulation/Temperature WDL    Skin Circulation/Temperature WDL WDL       Cardiac WDL    Cardiac WDL WDL       Peripheral/Neurovascular WDL    Peripheral Neurovascular WDL WDL  dizziness (feels like on a boat)       Cognitive/Neuro/Behavioral WDL    Cognitive/Neuro/Behavioral WDL X    Level of Consciousness alert    Arousal Level opens eyes spontaneously    Orientation oriented x 4    Speech clear;logical    Mood/Behavior calm;cooperative       Counce Coma Scale    Best Eye Response 4-->(E4) spontaneous    Best Motor Response 6-->(M6) obeys commands    Best Verbal Response 5-->(V5) oriented    Counce Coma Scale Score 15              "

## 2023-02-23 NOTE — ED NOTES
Discharge instructions discussed with patient. All questions answered at time of discharge and patient agreeable with discharge plan.

## 2023-02-23 NOTE — ED PROVIDER NOTES
"  Emergency Department Encounter         FINAL IMPRESSION:  Lightheadedness        ED COURSE AND MEDICAL DECISION MAKING   5:02 AM I met with the patient to obtain patient history and performed a physical exam. Discussed plan for ED work up including potential diagnostic studies and interventions.      ED Course as of 02/23/23 0527   Thu Feb 23, 2023   0510 37-year-old female history of anxiety/panic attacks/depression, recently increased her Lexapro 4 days ago, here with feeling uneasy and unwell.  Patient states a couple hours ago she woke up randomly in the middle the night.  States she raised her right hand above her head and stated that it felt \"like I was moving in slow motion.\"  That then concerned the patient so she got out of bed.  States when she was walking she felt she was going to possibly pass out versus off balance.  States that everything \"looked like it was moving in slow motion I felt really unwell.\"  Called EMS who came to pick her up.  Patient denies any headaches or syncope.  No vision issues.  No trouble swallowing.  No paresthesias.  No chest pain or trouble breathing.  States she has chronic GERD of which she is taking omeprazole.  No abdominal pain.  No difficulty walking.   has been having heavier than normal periods recently after her delivery few months ago.  On arrival here her vitals are stable.  She looks well clinically.  Heart and lungs normal.  NIH of 0.  Unsure what is causing her symptoms.  We will screen for electrolyte disturbances as well as grab an EKG.  It could definitely be the Lexapro increase we will rule out anything else.  Does not necessarily fit with a vertiginous process as she has no nystagmus here and does not really endorse imbalance.  She seems to have a mix of imbalance as well as presyncope.  Fluids also ordered.   0520 EKG is sinus rhythm 60, no signs acute ischemia, no inversions no depressions QTc 428.  Unchanged from previous EKG in May 2022. "       Mild hypokalemia.  Troponin negative.  EKG as above.  Unsure what is causing her symptoms.  She states she does feel better with meclizine.  Vertigo is a possibility peer recommended patient decrease her dose back to the normal dose that was increased 4 days ago.  Plan for discharge home                   Medical Decision Making    History:    Supplemental history from: Documented in chart, if applicable    External Record(s) reviewed: Documented in chart, if applicable.    Work Up:    Chart documentation includes differential considered and any EKGs or imaging independently interpreted by provider, where specified.    In additional to work up documented, I considered the following work up: Documented in chart, if applicable.    External consultation:    Discussion of management with another provider: Documented in chart, if applicable    Complicating factors:    Care impacted by chronic illness: Mental Health    Care affected by social determinants of health: N/A    Disposition considerations: Discharge. I prescribed additional prescription strength medication(s) as charted. See documentation for any additional details.                  EKG      At the conclusion of the encounter I discussed the results of all the tests and the disposition. The questions were answered. The patient or family acknowledged understanding and was agreeable with the care plan.                  MEDICATIONS GIVEN IN THE EMERGENCY DEPARTMENT:  Medications   lactated ringers BOLUS 1,000 mL (has no administration in time range)   meclizine (ANTIVERT) tablet 25 mg (has no administration in time range)       NEW PRESCRIPTIONS STARTED AT TODAY'S ED VISIT:  New Prescriptions    No medications on file       HPI     Patient information obtained from: patient    Use of Interpretor: N/A     Mignon Treviño is a 37 year old female with a pertinent history of anxiety, GERD, panic attacks, abnormal uterine bleeding, depression, who presents to  "this ED via EMS for evaluation of dizziness.    Patient reports she randomly woke up this morning and lifted her right arm above her head. She states that \"it felt like she was moving in slow motion.\" She got up to notify her boyfriend but suddenly felt dizzy and light headed. She states she was \"feeling out of it like she was on drugs.\" She denies illicit drug use. She says 4 days ago, she increased her Lexapro dose and states she was feeling fine.     She also endorses some nausea and tingling everywhere. She denies change in vision, headache, vomiting, abdominal pain, change in activity, and change in appetite. She states she has been having heavier than normal menstrual periods after having her baby (delievered 11/30/2022) and is currently being evaluated for it. She notes during her pregnancy, she was anemic. She recently rechecked iron levels which were only slightly low.There were no other concerns/complaints at this time.        REVIEW OF SYSTEMS:  Review of Systems   Constitutional: Negative for fever, malaise, change in activity or appetite  HEENT: Negative runny nose, sore throat, ear pain, neck pain, change in vision   Respiratory: Negative for shortness of breath, cough, congestion  Cardiovascular: Negative for chest pain, leg edema  Gastrointestinal: Endorses nausea. Negative for abdominal distention, abdominal pain, constipation, vomiting, diarrhea  Genitourinary: Negative for dysuria and hematuria.   Integument: Negative for rash, skin breakdown  Neurological: Endorses tingling, light headedness, dizziness. Negative for weakness, headache.  Musculoskeletal: Negative for joint pain, joint swelling      All other systems reviewed and are negative.          MEDICAL HISTORY     Past Medical History:   Diagnosis Date     Abnormal Pap smear of cervix 07/24/2013     Cervical high risk HPV (human papillomavirus) test positive 07/24/2013     Gastroesophageal reflux disease without esophagitis      Generalized " anxiety disorder 2010     Hiatal hernia 04/02/2021     History of colposcopy 08/31/2013     Panic attack 2010     Vitamin D deficiency 03/20/2021       Past Surgical History:   Procedure Laterality Date     WISDOM TOOTH EXTRACTION         Social History     Tobacco Use     Smoking status: Former     Types: Cigarettes     Smokeless tobacco: Never   Substance Use Topics     Alcohol use: Yes     Comment: very rare-1-2x year     Drug use: Yes     Types: Marijuana     Comment: occasionally       escitalopram (LEXAPRO) 20 MG tablet  acetaminophen (TYLENOL) 500 MG tablet  aspirin (ASA) 81 MG chewable tablet  ferrous sulfate (FEROSUL) 325 (65 Fe) MG tablet  LORazepam (ATIVAN) 1 MG tablet  omeprazole (PRILOSEC) 20 MG DR capsule            PHYSICAL EXAM     /57   Pulse 79   Temp 97  F (36.1  C) (Temporal)   Resp 18   Wt 63.5 kg (140 lb)   SpO2 98%   BMI 20.67 kg/m        PHYSICAL EXAM:     General: Patient appears well, nontoxic, comfortable  HEENT: Moist mucous membranes,  No head trauma.  No midline neck pain.  Cardiovascular: Normal rate, normal rhythm, no extremity edema.  No appreciable murmur.  Respiratory: No signs of respiratory distress, lungs are clear to auscultation bilaterally with no wheezes rhonchi or rales.  Abdominal: Soft, nontender, nondistended, no palpable masses, no guarding, no rebound  Musculoskeletal: Full range of motion of joints, no deformities appreciated.  Neurological: Alert and oriented, grossly neurologically intact. NIH of 0.   Psychological: Normal affect and mood.  Integument: No rashes appreciated          RESULTS       Labs Ordered and Resulted from Time of ED Arrival to Time of ED Departure - No data to display    No orders to display                     PROCEDURES:  Procedures:  Procedures       IDariela am serving as a scribe to document services personally performed by Camron Adrian DO, based on my observations and the provider's statements to me.  Camron CARO DO, attest  that Dariela Spear is acting in a scribe capacity, has observed my performance of the services and has documented them in accordance with my direction.    Camron Adrian DO  Emergency Medicine  [facility] EMERGENCY ROOM     Ohl, Camron Mosley DO  02/23/23 0650

## 2023-04-07 ENCOUNTER — MEDICAL CORRESPONDENCE (OUTPATIENT)
Dept: HEALTH INFORMATION MANAGEMENT | Facility: CLINIC | Age: 38
End: 2023-04-07
Payer: COMMERCIAL

## 2023-04-27 ENCOUNTER — MEDICAL CORRESPONDENCE (OUTPATIENT)
Dept: HEALTH INFORMATION MANAGEMENT | Facility: CLINIC | Age: 38
End: 2023-04-27
Payer: COMMERCIAL

## 2023-06-02 ENCOUNTER — MEDICAL CORRESPONDENCE (OUTPATIENT)
Dept: HEALTH INFORMATION MANAGEMENT | Facility: CLINIC | Age: 38
End: 2023-06-02
Payer: COMMERCIAL

## 2023-06-03 ENCOUNTER — HEALTH MAINTENANCE LETTER (OUTPATIENT)
Age: 38
End: 2023-06-03

## 2024-07-07 ENCOUNTER — HEALTH MAINTENANCE LETTER (OUTPATIENT)
Age: 39
End: 2024-07-07

## 2024-08-14 ENCOUNTER — VIRTUAL VISIT (OUTPATIENT)
Dept: FAMILY MEDICINE | Facility: CLINIC | Age: 39
End: 2024-08-14
Payer: COMMERCIAL

## 2024-08-14 DIAGNOSIS — K21.9 GASTROESOPHAGEAL REFLUX DISEASE WITHOUT ESOPHAGITIS: ICD-10-CM

## 2024-08-14 DIAGNOSIS — K44.9 HIATAL HERNIA: Primary | ICD-10-CM

## 2024-08-14 PROBLEM — O47.9 UTERINE CONTRACTIONS: Status: ACTIVE | Noted: 2024-08-14

## 2024-08-14 PROBLEM — R51.9 CHRONIC HEADACHES: Status: ACTIVE | Noted: 2022-11-30

## 2024-08-14 PROBLEM — K12.2 SUBMANDIBULAR SPACE INFECTION: Status: ACTIVE | Noted: 2017-11-05

## 2024-08-14 PROBLEM — O41.1290 CHORIOAMNIONITIS: Status: ACTIVE | Noted: 2022-12-07

## 2024-08-14 PROBLEM — G89.29 CHRONIC HEADACHES: Status: ACTIVE | Noted: 2022-11-30

## 2024-08-14 PROBLEM — E44.0 MODERATE MALNUTRITION (H): Status: ACTIVE | Noted: 2017-11-10

## 2024-08-14 PROBLEM — F41.8 SITUATIONAL ANXIETY: Status: ACTIVE | Noted: 2017-07-28

## 2024-08-14 PROCEDURE — 99204 OFFICE O/P NEW MOD 45 MIN: CPT | Mod: 95 | Performed by: PHYSICIAN ASSISTANT

## 2024-08-14 PROCEDURE — G2211 COMPLEX E/M VISIT ADD ON: HCPCS | Mod: 95 | Performed by: PHYSICIAN ASSISTANT

## 2024-08-14 RX ORDER — BUPROPION HYDROCHLORIDE 75 MG/1
TABLET ORAL
COMMUNITY
Start: 2024-08-09

## 2024-08-14 RX ORDER — GUANFACINE 1 MG/1
TABLET, EXTENDED RELEASE ORAL
COMMUNITY
Start: 2024-07-03

## 2024-08-14 RX ORDER — IBUPROFEN 200 MG
200-600 TABLET ORAL
COMMUNITY
Start: 2022-11-30

## 2024-08-14 NOTE — PROGRESS NOTES
Mignon is a 39 year old who is being evaluated via a billable video visit.    How would you like to obtain your AVS? MyChart  If the video visit is dropped, the invitation should be resent by: Text to cell phone: [phone]  Will anyone else be joining your video visit? No      Assessment & Plan     Hiatal hernia  Noted on endoscopy 2021 at [facility]  - Adult GI  Referral - Procedure Only  - Adult GI  Referral - Consult Only    Gastroesophageal reflux disease without esophagitis  Worsening over the past month.  Was on omeprazole 20 mg daily, she has increased to bid dosing  Will get lab work  Refer for endoscopy  Refer for gi consult  - Adult GI  Referral - Procedure Only  - CBC with platelets  - Comprehensive metabolic panel (BMP + Alb, Alk Phos, ALT, AST, Total. Bili, TP)  - TSH with free T4 reflex  - Adult GI  Referral - Consult Only      The longitudinal plan of care for the diagnosis(es)/condition(s) as documented were addressed during this visit. Due to the added complexity in care, I will continue to support Mignon in the subsequent management and with ongoing continuity of care.              Subjective   Mignon is a 39 year old, presenting for the following health issues:  Referral (For endoscopy)      8/14/2024     8:39 AM   Additional Questions   Roomed by Natali SIDDIQUI MA     History of Present Illness       Reason for visit:  GERD    She eats 2-3 servings of fruits and vegetables daily.She consumes 2 sweetened beverage(s) daily.She exercises with enough effort to increase her heart rate 10 to 19 minutes per day.  She exercises with enough effort to increase her heart rate 3 or less days per week. She is missing 1 dose(s) of medications per week.  She is not taking prescribed medications regularly due to remembering to take.     GERD and hiatal hernia, has seen [facility] and had endoscopy in 2021.  Takes omeprazole 20 mg daily.  Over the past month notes increase in GERD  symptoms, has increase in pain in epigastric area that is constant and worsened with eating,  has discomfort with swallowing.  She has increased her omeprazole to 20 mg bid for the past 3 days.  Denies vomiting, diarrhea, constipation, fevers, chills or body aches.  Has had mild weight loss but says her weight tends to fluctuate normally.  Has had increase stress in the past month,  no changes in diet      Review of Systems  Constitutional, HEENT, cardiovascular, pulmonary, gi and gu systems are negative, except as otherwise noted.      Objective           Vitals:  No vitals were obtained today due to virtual visit.    Physical Exam   GENERAL: alert and no distress  EYES: Eyes grossly normal to inspection.  No discharge or erythema, or obvious scleral/conjunctival abnormalities.  RESP: No audible wheeze, cough, or visible cyanosis.    SKIN: Visible skin clear. No significant rash, abnormal pigmentation or lesions.  NEURO: Cranial nerves grossly intact.  Mentation and speech appropriate for age.  PSYCH: Appropriate affect, tone, and pace of words          Video-Visit Details    Type of service:  Video Visit   Originating Location (pt. Location): Home    Distant Location (provider location):  Off-site  Platform used for Video Visit: Emma  Signed Electronically by: Debo Finnegan PA-C

## 2024-08-19 ENCOUNTER — LAB (OUTPATIENT)
Dept: LAB | Facility: CLINIC | Age: 39
End: 2024-08-19
Payer: COMMERCIAL

## 2024-08-19 DIAGNOSIS — K21.9 GASTROESOPHAGEAL REFLUX DISEASE WITHOUT ESOPHAGITIS: ICD-10-CM

## 2024-08-19 LAB
ALBUMIN SERPL BCG-MCNC: 4.5 G/DL (ref 3.5–5.2)
ALP SERPL-CCNC: 38 U/L (ref 40–150)
ALT SERPL W P-5'-P-CCNC: <5 U/L (ref 0–50)
ANION GAP SERPL CALCULATED.3IONS-SCNC: 11 MMOL/L (ref 7–15)
AST SERPL W P-5'-P-CCNC: 14 U/L (ref 0–45)
BILIRUB SERPL-MCNC: 0.3 MG/DL
BUN SERPL-MCNC: 6.4 MG/DL (ref 6–20)
CALCIUM SERPL-MCNC: 9.2 MG/DL (ref 8.8–10.4)
CHLORIDE SERPL-SCNC: 104 MMOL/L (ref 98–107)
CREAT SERPL-MCNC: 0.72 MG/DL (ref 0.51–0.95)
EGFRCR SERPLBLD CKD-EPI 2021: >90 ML/MIN/1.73M2
ERYTHROCYTE [DISTWIDTH] IN BLOOD BY AUTOMATED COUNT: 12.9 % (ref 10–15)
GLUCOSE SERPL-MCNC: 89 MG/DL (ref 70–99)
HCO3 SERPL-SCNC: 23 MMOL/L (ref 22–29)
HCT VFR BLD AUTO: 41.4 % (ref 35–47)
HGB BLD-MCNC: 13.7 G/DL (ref 11.7–15.7)
MCH RBC QN AUTO: 32.7 PG (ref 26.5–33)
MCHC RBC AUTO-ENTMCNC: 33.1 G/DL (ref 31.5–36.5)
MCV RBC AUTO: 99 FL (ref 78–100)
PLATELET # BLD AUTO: 230 10E3/UL (ref 150–450)
POTASSIUM SERPL-SCNC: 4.9 MMOL/L (ref 3.4–5.3)
PROT SERPL-MCNC: 7.1 G/DL (ref 6.4–8.3)
RBC # BLD AUTO: 4.19 10E6/UL (ref 3.8–5.2)
SODIUM SERPL-SCNC: 138 MMOL/L (ref 135–145)
TSH SERPL DL<=0.005 MIU/L-ACNC: 3.45 UIU/ML (ref 0.3–4.2)
WBC # BLD AUTO: 5.2 10E3/UL (ref 4–11)

## 2024-08-19 PROCEDURE — 85027 COMPLETE CBC AUTOMATED: CPT

## 2024-08-19 PROCEDURE — 80053 COMPREHEN METABOLIC PANEL: CPT

## 2024-08-19 PROCEDURE — 84443 ASSAY THYROID STIM HORMONE: CPT

## 2024-08-19 PROCEDURE — 36415 COLL VENOUS BLD VENIPUNCTURE: CPT

## 2024-09-03 ENCOUNTER — TRANSFERRED RECORDS (OUTPATIENT)
Dept: HEALTH INFORMATION MANAGEMENT | Facility: CLINIC | Age: 39
End: 2024-09-03
Payer: COMMERCIAL

## 2025-03-13 ENCOUNTER — TRANSFERRED RECORDS (OUTPATIENT)
Dept: MULTI SPECIALTY CLINIC | Facility: CLINIC | Age: 40
End: 2025-03-13

## 2025-03-13 LAB — PAP SMEAR - HIM PATIENT REPORTED: NORMAL

## 2025-03-27 ENCOUNTER — OFFICE VISIT (OUTPATIENT)
Dept: URGENT CARE | Facility: URGENT CARE | Age: 40
End: 2025-03-27
Payer: COMMERCIAL

## 2025-03-27 VITALS
DIASTOLIC BLOOD PRESSURE: 68 MMHG | TEMPERATURE: 98.2 F | BODY MASS INDEX: 17.57 KG/M2 | RESPIRATION RATE: 16 BRPM | OXYGEN SATURATION: 99 % | HEART RATE: 93 BPM | SYSTOLIC BLOOD PRESSURE: 110 MMHG | WEIGHT: 119 LBS

## 2025-03-27 DIAGNOSIS — J01.90 ACUTE SINUSITIS WITH SYMPTOMS > 10 DAYS: Primary | ICD-10-CM

## 2025-03-27 RX ORDER — DOXYCYCLINE 100 MG/1
100 CAPSULE ORAL 2 TIMES DAILY
Qty: 20 CAPSULE | Refills: 0 | Status: SHIPPED | OUTPATIENT
Start: 2025-03-27 | End: 2025-04-06

## 2025-03-27 NOTE — PROGRESS NOTES
Assessment:       Acute sinusitis with symptoms > 10 days  - doxycycline hyclate (VIBRAMYCIN) 100 MG capsule  Dispense: 20 capsule; Refill: 0     Plan:     Symptoms consistent with acute bacterial sinusitis.  Patient started on doxycycline.  Recommend decongestants and ibuprofen as well for symptoms.  Follow-up if symptoms are getting worse or not continuing to improve.      MEDICATIONS:   Orders Placed This Encounter   Medications    doxycycline hyclate (VIBRAMYCIN) 100 MG capsule     Sig: Take 1 capsule (100 mg) by mouth 2 times daily for 10 days.     Dispense:  20 capsule     Refill:  0       Subjective:       39 year old female presents for evaluation of a 2-week history of nasal congestion and cough with progressively worsening facial pain and pressure across her face and complaint of upper teeth pain and pain on the roof of her mouth over the past week.  She has been feeling very rundown.  The pain in her face is much worse if she leans forward.  She has been having headaches.  She is not sure if she has had a fever.  She is taken ibuprofen and acetaminophen which has helped slightly but not significantly so.  Denies sore throat or ear pain.    Patient Active Problem List   Diagnosis    Neck pain on right side    Dysplasia of cervix, low grade (RICHARD 1)    Vitamin D deficiency    Panic attack    Hiatal hernia    Generalized anxiety disorder    Gastroesophageal reflux disease without esophagitis    Chorioamnionitis    Chronic headaches    Moderate malnutrition    Situational anxiety    Submandibular space infection    Uterine contractions    Pap smear abnormality of cervix with ASCUS favoring dysplasia    Abnormal Papanicolaou smear of cervix with positive human papilloma virus (HPV) test       Past Medical History:   Diagnosis Date    Abnormal Pap smear of cervix 07/24/2013    Cervical high risk HPV (human papillomavirus) test positive 07/24/2013    Gastroesophageal reflux disease without esophagitis      Generalized anxiety disorder 2010    Hiatal hernia 04/02/2021    hiatal hernia seen on EGD    History of colposcopy 08/31/2013    6/10/15, 1/15/18    Panic attack 2010    acute panic attacks during periods of severe anxiety    Vitamin D deficiency 03/20/2021    vitamin D 15       Past Surgical History:   Procedure Laterality Date    WISDOM TOOTH EXTRACTION         Current Outpatient Medications   Medication Sig Dispense Refill    acetaminophen (TYLENOL) 500 MG tablet Take 1,000 mg by mouth as needed for mild pain      doxycycline hyclate (VIBRAMYCIN) 100 MG capsule Take 1 capsule (100 mg) by mouth 2 times daily for 10 days. 20 capsule 0    escitalopram (LEXAPRO) 20 MG tablet Take 20 mg by mouth daily      omeprazole (PRILOSEC) 20 MG DR capsule Take 1 capsule (20 mg) by mouth daily 90 capsule 3    buPROPion (WELLBUTRIN) 75 MG tablet  (Patient not taking: Reported on 3/27/2025)      guanFACINE (INTUNIV) 1 MG TB24 24 hr tablet  (Patient not taking: Reported on 3/27/2025)      ibuprofen (ADVIL/MOTRIN) 200 MG tablet Take 200-600 mg by mouth (Patient not taking: Reported on 3/27/2025)      LORazepam (ATIVAN) 1 MG tablet Take 0.5-1 tablets (0.5-1 mg) by mouth daily as needed (FOR ACUTE ANXIETY OR PANIC ATTACK) (Patient not taking: Reported on 3/27/2025) 10 tablet 0     No current facility-administered medications for this visit.       Allergies   Allergen Reactions    Amoxicillin Rash     Other reaction(s): *Unknown    Cefdinir      bruxism    Penicillins Rash       Family History   Problem Relation Age of Onset    No Known Problems Mother     Diabetes Type 2  Father     No Known Problems Sister     No Known Problems Brother     No Known Problems Brother     No Known Problems Sister     Breast Cancer Cousin 29    Colon Cancer No family hx of     Coronary Artery Disease Early Onset No family hx of        Social History     Socioeconomic History    Marital status: Single   Tobacco Use    Smoking status: Former     Types:  Cigarettes     Passive exposure: Past    Smokeless tobacco: Never   Vaping Use    Vaping status: Never Used   Substance and Sexual Activity    Alcohol use: Yes     Comment: very rare-1-2x year    Drug use: Yes     Types: Marijuana     Comment: occasionally    Sexual activity: Yes     Partners: Male     Birth control/protection: Condom     Social Drivers of Health      Received from [facility], [facility]    Social Connections         Review of Systems  Pertinent items are noted in HPI.      Objective:                   General Appearance:    /68   Pulse 93   Temp 98.2  F (36.8  C)   Resp 16   Wt 54 kg (119 lb)   LMP 03/10/2025   SpO2 99%   BMI 17.57 kg/m          Alert, mildly ill-appearing but in no distress   Head:    Normocephalic, without obvious abnormality, atraumatic   Eyes:    Conjunctiva/corneas clear   Ears:    Normal TM's without erythema or bulging. Normal external ear canals, both ears   Nose: Bilateral sinus tenderness is noted in the maxillary sinuses.   Throat:   Lips, mucosa, and tongue normal; teeth and gums normal.  No tonsilar hypertrophy or exudate.   Neck:   Supple, symmetrical, trachea midline, no adenopathy    Lungs:     Clear to auscultation bilaterally without wheezes, rales, or rhonchi, respirations unlabored    Heart:    Regular rate and rhythm, S1 and S2 normal, no murmur, rub or gallop       Extremities:   Extremities normal, atraumatic, no cyanosis or edema   Skin:   Skin color, texture, turgor normal, no rashes or lesions         This note has been dictated using voice recognition software. Any grammatical or context distortions are unintentional and inherent to the software

## 2025-07-14 ENCOUNTER — OFFICE VISIT (OUTPATIENT)
Dept: INTERNAL MEDICINE | Facility: CLINIC | Age: 40
End: 2025-07-14
Payer: COMMERCIAL

## 2025-07-14 VITALS
OXYGEN SATURATION: 98 % | DIASTOLIC BLOOD PRESSURE: 70 MMHG | BODY MASS INDEX: 17.48 KG/M2 | WEIGHT: 118 LBS | SYSTOLIC BLOOD PRESSURE: 112 MMHG | HEIGHT: 69 IN | HEART RATE: 60 BPM | TEMPERATURE: 97.6 F | RESPIRATION RATE: 16 BRPM

## 2025-07-14 DIAGNOSIS — K21.9 GASTROESOPHAGEAL REFLUX DISEASE WITHOUT ESOPHAGITIS: ICD-10-CM

## 2025-07-14 DIAGNOSIS — M25.50 MULTIPLE JOINT PAIN: ICD-10-CM

## 2025-07-14 DIAGNOSIS — Z00.00 ENCOUNTER FOR ANNUAL PHYSICAL EXAM: Primary | ICD-10-CM

## 2025-07-14 DIAGNOSIS — F41.0 PANIC ATTACK: ICD-10-CM

## 2025-07-14 DIAGNOSIS — Z13.1 SCREENING FOR DIABETES MELLITUS: ICD-10-CM

## 2025-07-14 DIAGNOSIS — F42.9 OBSESSIVE-COMPULSIVE DISORDER, UNSPECIFIED TYPE: ICD-10-CM

## 2025-07-14 DIAGNOSIS — E55.9 VITAMIN D DEFICIENCY: ICD-10-CM

## 2025-07-14 DIAGNOSIS — L98.9 SKIN LESION: ICD-10-CM

## 2025-07-14 DIAGNOSIS — F41.1 GENERALIZED ANXIETY DISORDER: ICD-10-CM

## 2025-07-14 DIAGNOSIS — Z13.6 SCREENING FOR CARDIOVASCULAR CONDITION: ICD-10-CM

## 2025-07-14 DIAGNOSIS — N64.4 BREAST PAIN: ICD-10-CM

## 2025-07-14 DIAGNOSIS — E44.1 MILD PROTEIN-CALORIE MALNUTRITION: ICD-10-CM

## 2025-07-14 LAB
ALBUMIN SERPL BCG-MCNC: 4.6 G/DL (ref 3.5–5.2)
ALP SERPL-CCNC: 34 U/L (ref 40–150)
ALT SERPL W P-5'-P-CCNC: 6 U/L (ref 0–50)
ANION GAP SERPL CALCULATED.3IONS-SCNC: 11 MMOL/L (ref 7–15)
AST SERPL W P-5'-P-CCNC: 17 U/L (ref 0–45)
BILIRUB SERPL-MCNC: 0.8 MG/DL
BUN SERPL-MCNC: 5.9 MG/DL (ref 6–20)
CALCIUM SERPL-MCNC: 9.2 MG/DL (ref 8.8–10.4)
CHLORIDE SERPL-SCNC: 105 MMOL/L (ref 98–107)
CHOLEST SERPL-MCNC: 168 MG/DL
CREAT SERPL-MCNC: 0.64 MG/DL (ref 0.51–0.95)
EGFRCR SERPLBLD CKD-EPI 2021: >90 ML/MIN/1.73M2
ERYTHROCYTE [DISTWIDTH] IN BLOOD BY AUTOMATED COUNT: 12.5 % (ref 10–15)
EST. AVERAGE GLUCOSE BLD GHB EST-MCNC: 100 MG/DL
FASTING STATUS PATIENT QL REPORTED: YES
FASTING STATUS PATIENT QL REPORTED: YES
GLUCOSE SERPL-MCNC: 92 MG/DL (ref 70–99)
HBA1C MFR BLD: 5.1 % (ref 0–5.6)
HCO3 SERPL-SCNC: 24 MMOL/L (ref 22–29)
HCT VFR BLD AUTO: 38.7 % (ref 35–47)
HDLC SERPL-MCNC: 43 MG/DL
HGB BLD-MCNC: 13.3 G/DL (ref 11.7–15.7)
LDLC SERPL CALC-MCNC: 103 MG/DL
MCH RBC QN AUTO: 32.7 PG (ref 26.5–33)
MCHC RBC AUTO-ENTMCNC: 34.4 G/DL (ref 31.5–36.5)
MCV RBC AUTO: 95 FL (ref 78–100)
NONHDLC SERPL-MCNC: 125 MG/DL
PLATELET # BLD AUTO: 207 10E3/UL (ref 150–450)
POTASSIUM SERPL-SCNC: 4.3 MMOL/L (ref 3.4–5.3)
PROT SERPL-MCNC: 7.2 G/DL (ref 6.4–8.3)
RBC # BLD AUTO: 4.07 10E6/UL (ref 3.8–5.2)
SODIUM SERPL-SCNC: 140 MMOL/L (ref 135–145)
TRIGL SERPL-MCNC: 109 MG/DL
VIT D+METAB SERPL-MCNC: 14 NG/ML (ref 20–50)
WBC # BLD AUTO: 4.3 10E3/UL (ref 4–11)

## 2025-07-14 PROCEDURE — 86038 ANTINUCLEAR ANTIBODIES: CPT | Performed by: NURSE PRACTITIONER

## 2025-07-14 PROCEDURE — 3044F HG A1C LEVEL LT 7.0%: CPT | Performed by: NURSE PRACTITIONER

## 2025-07-14 PROCEDURE — 3074F SYST BP LT 130 MM HG: CPT | Performed by: NURSE PRACTITIONER

## 2025-07-14 PROCEDURE — 3078F DIAST BP <80 MM HG: CPT | Performed by: NURSE PRACTITIONER

## 2025-07-14 PROCEDURE — G2211 COMPLEX E/M VISIT ADD ON: HCPCS | Performed by: NURSE PRACTITIONER

## 2025-07-14 PROCEDURE — 85027 COMPLETE CBC AUTOMATED: CPT | Performed by: NURSE PRACTITIONER

## 2025-07-14 PROCEDURE — 86618 LYME DISEASE ANTIBODY: CPT | Performed by: NURSE PRACTITIONER

## 2025-07-14 PROCEDURE — 83036 HEMOGLOBIN GLYCOSYLATED A1C: CPT | Performed by: NURSE PRACTITIONER

## 2025-07-14 PROCEDURE — 36415 COLL VENOUS BLD VENIPUNCTURE: CPT | Performed by: NURSE PRACTITIONER

## 2025-07-14 PROCEDURE — 99395 PREV VISIT EST AGE 18-39: CPT | Performed by: NURSE PRACTITIONER

## 2025-07-14 PROCEDURE — 99214 OFFICE O/P EST MOD 30 MIN: CPT | Mod: 25 | Performed by: NURSE PRACTITIONER

## 2025-07-14 PROCEDURE — 80061 LIPID PANEL: CPT | Performed by: NURSE PRACTITIONER

## 2025-07-14 PROCEDURE — 80053 COMPREHEN METABOLIC PANEL: CPT | Performed by: NURSE PRACTITIONER

## 2025-07-14 PROCEDURE — 82306 VITAMIN D 25 HYDROXY: CPT | Performed by: NURSE PRACTITIONER

## 2025-07-14 RX ORDER — CIMETIDINE 300 MG
300 TABLET ORAL 2 TIMES DAILY
Qty: 180 TABLET | Refills: 0 | Status: SHIPPED | OUTPATIENT
Start: 2025-07-14

## 2025-07-14 RX ORDER — FAMOTIDINE 20 MG/1
20 TABLET, FILM COATED ORAL 2 TIMES DAILY
Qty: 180 TABLET | Refills: 0 | Status: SHIPPED | OUTPATIENT
Start: 2025-07-14 | End: 2025-07-14

## 2025-07-14 SDOH — HEALTH STABILITY: PHYSICAL HEALTH: ON AVERAGE, HOW MANY DAYS PER WEEK DO YOU ENGAGE IN MODERATE TO STRENUOUS EXERCISE (LIKE A BRISK WALK)?: 2 DAYS

## 2025-07-14 SDOH — HEALTH STABILITY: PHYSICAL HEALTH: ON AVERAGE, HOW MANY MINUTES DO YOU ENGAGE IN EXERCISE AT THIS LEVEL?: 20 MIN

## 2025-07-14 ASSESSMENT — ANXIETY QUESTIONNAIRES
6. BECOMING EASILY ANNOYED OR IRRITABLE: MORE THAN HALF THE DAYS
3. WORRYING TOO MUCH ABOUT DIFFERENT THINGS: MORE THAN HALF THE DAYS
IF YOU CHECKED OFF ANY PROBLEMS ON THIS QUESTIONNAIRE, HOW DIFFICULT HAVE THESE PROBLEMS MADE IT FOR YOU TO DO YOUR WORK, TAKE CARE OF THINGS AT HOME, OR GET ALONG WITH OTHER PEOPLE: VERY DIFFICULT
5. BEING SO RESTLESS THAT IT IS HARD TO SIT STILL: NOT AT ALL
1. FEELING NERVOUS, ANXIOUS, OR ON EDGE: MORE THAN HALF THE DAYS
GAD7 TOTAL SCORE: 12
7. FEELING AFRAID AS IF SOMETHING AWFUL MIGHT HAPPEN: MORE THAN HALF THE DAYS
GAD7 TOTAL SCORE: 12
2. NOT BEING ABLE TO STOP OR CONTROL WORRYING: MORE THAN HALF THE DAYS

## 2025-07-14 ASSESSMENT — SOCIAL DETERMINANTS OF HEALTH (SDOH): HOW OFTEN DO YOU GET TOGETHER WITH FRIENDS OR RELATIVES?: NEVER

## 2025-07-14 ASSESSMENT — PATIENT HEALTH QUESTIONNAIRE - PHQ9
5. POOR APPETITE OR OVEREATING: MORE THAN HALF THE DAYS
SUM OF ALL RESPONSES TO PHQ QUESTIONS 1-9: 7

## 2025-07-14 NOTE — PATIENT INSTRUCTIONS
Labs today. Will let you know results through [employer] once available.     Mammogram ordered as well.     I placed a referral for psychiatry and psychology.  So we should call you within a few business days to schedule this appointment.    We will have you follow-up with her family med/OB provider Dr. Haq to discuss ParaGard IUD insertion and perimenopause like symptoms.    Follow-up with me for lesion removal.    Follow-up with [facility] for ongoing acid reflux symptoms however I did prescribe you famotidine that you can take twice daily.  If this is not covered by insurance well you can pick this up over-the-counter and take it twice daily.    I recommend drinking a nutritional shakes such as Ensure or boost daily.    I would recommend taking a multivitamin daily as well.  This could also be a children's multivitamin as it might be easier on your stomach.    I would like to see you back in 1 year for an annual exam however depending on any abnormal lab results or other symptoms I may need to see you sooner.

## 2025-07-14 NOTE — PROGRESS NOTES
Preventive Care Visit  [facility]  Jul 14, 2025      Assessment & Plan     Encounter for annual physical exam  Reviewed over health maintenance.  Screening labs today as noted below.  Reviewed cervical cancer, breast cancer and colorectal cancer screening guidelines.  Return in 1 year for annual exam.  - REVIEW OF HEALTH MAINTENANCE PROTOCOL ORDERS  - CBC with platelets; Future  - CBC with platelets    Screening for cardiovascular condition  - Lipid Profile; Future  - Lipid Profile    Screening for diabetes mellitus  - Hemoglobin A1c; Future  - Hemoglobin A1c    Obsessive-compulsive disorder, unspecified type  Generalized anxiety disorder  Panic attack  Referral for both psychiatry and psychology places patient states her previous providers have left and she is needing to establish care with new ones.  She is currently on Lexapro, Ativan and medical marijuana.  States overall she has had some increase in her anxiety and panic attacks but denies any suicidal or homicidal ideation.  - Adult Mental Health  Referral; Future  - Adult Mental Health  Referral; Future    Gastroesophageal reflux disease without esophagitis  Has long-term issues with acid reflux.  Does see [facility] and is currently on Nexium twice daily.  She states that this has helped her acid reflux symptoms but she still has a lot of acid reflux.  Has known hiatal hernia.  Does have upcoming appointment with GI in August.  At this time due to ongoing symptoms we discussed adding on famotidine to see if this can provide her some relief.  She states she has not tried this in the past.  Will also get CMP today.  - famotidine (PEPCID) 20 MG tablet; Take 1 tablet (20 mg) by mouth 2 times daily.  - Comprehensive metabolic panel; Future  - Comprehensive metabolic panel    Vitamin D deficiency  Reports a history of vitamin D deficiency.  Will check vitamin D level today.  Not  currently on vitamin D supplement.  - Vitamin D Deficiency; Future  - Vitamin D Deficiency    Mild protein-calorie malnutrition  Patient states as a result of her severe acid reflux she often has a hard time eating leading to malnutrition.  We discussed the importance of ensuring she is drinking nutritional beverages such as Ensure or boost daily.  We also discussed the importance of being on a multivitamin daily.  Due to her acid reflux she may tolerate a chewable children's multivitamin over an adult multivitamin daily.  We also discussed that with her ongoing concerns with weight loss and other concerns today such as joint pain we will obtain some additional labs as noted below to rule out any underlying cause other than just complications from her acid reflux.  - Vitamin D Deficiency; Future  - Comprehensive metabolic panel; Future  - Anti Nuclear Madhuri IgG by IFA with Reflex; Future  - LYME DISEASE TOTAL ANTIBODIES WITH REFLEX TO CONFIRMATION; Future  - Vitamin D Deficiency  - Comprehensive metabolic panel  - Anti Nuclear Madhuri IgG by IFA with Reflex  - LYME DISEASE TOTAL ANTIBODIES WITH REFLEX TO CONFIRMATION    Breast pain  She has bilateral breast pain.  States she has a history of masses in her breast and has had ultrasounds and biopsies before.  This was done through [facility] but she is looking to get this done from now on through [facility].  Mammogram to orders patient does report some increased breast pain.  Denies any palpable lumps.  - MA Diagnostic Bilateral w/ [facility]; Future    Multiple joint pain  Patient has been having some multiple joint pain.  We discussed that this could possibly be related to her malnutrition however we will obtain additional labs as noted below to rule out other underlying cause.  - Anti Nuclear Madhuri IgG by IFA with Reflex; Future  - LYME DISEASE TOTAL ANTIBODIES WITH REFLEX TO CONFIRMATION; Future  - Anti Nuclear Madhuri IgG by IFA with Reflex  - LYME DISEASE TOTAL ANTIBODIES  WITH REFLEX TO CONFIRMATION    Skin lesion  Has a benign-appearing lesion on her back.  We discussed possible desired removal by the patient.  She will follow-up if wanting this removed.    The longitudinal plan of care for the diagnosis(es)/condition(s) as documented were addressed during this visit. Due to the added complexity in care, I will continue to support Mignon in the subsequent management and with ongoing continuity of care.      Patient has been advised of split billing requirements and indicates understanding: Yes    Counseling  Appropriate preventive services were addressed with this patient via screening, questionnaire, or discussion as appropriate for fall prevention, nutrition, physical activity, Tobacco-use cessation, social engagement, weight loss and cognition.  Checklist reviewing preventive services available has been given to the patient.  Reviewed patient's diet, addressing concerns and/or questions.   She is at risk for lack of exercise and has been provided with information to increase physical activity for the benefit of her well-being.   Patient is at risk for social isolation and has been provided with information about the benefit of social connection.   She is at risk for psychosocial distress and has been provided with information to reduce risk.   The patient's PHQ-9 score is consistent with mild depression. She was provided with information regarding depression.   Reviewed preventive health counseling, as reflected in patient instructions      Subjective   Mignon is a 40 year old, presenting for the following:  Physical (Pt is fasting- would like to establish care, pap in 3/25/Wants to check for diabetes and chol)        7/14/2025     9:16 AM   Additional Questions   Roomed by Soha FLANNERY  Mignon is a pleasant 40-year-old female with significant history of anxiety, OCD, agoraphobia, acid reflux and protein calorie malnutrition here today for annual exam.  She has  additional concerns today as noted above.  She is in a long-term relationship with her partner and they have 1 child together.  Her daughter will be 3 years old in November of this year.  Does go to [facility] smear.  Does have a history of abnormal Paps and states she gets her Paps yearly.         Advance Care Planning    Discussed advance care planning with patient; informed AVS has link to [facility].        7/14/2025   General Health   How would you rate your overall physical health? Good   Feel stress (tense, anxious, or unable to sleep) To some extent   (!) STRESS CONCERN      7/14/2025   Nutrition   Three or more servings of calcium each day? Yes   Diet: I don't know   How many servings of fruit and vegetables per day? (!) 2-3   How many sweetened beverages each day? (!) 2         7/14/2025   Exercise   Days per week of moderate/strenous exercise 2 days   Average minutes spent exercising at this level 20 min   (!) EXERCISE CONCERN      7/14/2025   Social Factors   Frequency of gathering with friends or relatives Never   Worry food won't last until get money to buy more No   Food not last or not have enough money for food? No   Do you have housing? (Housing is defined as stable permanent housing and does not include staying outside in a car, in a tent, in an abandoned building, in an overnight shelter, or couch-surfing.) Yes   Are you worried about losing your housing? No   Lack of transportation? No   Unable to get utilities (heat,electricity)? No   (!) SOCIAL CONNECTIONS CONCERN      7/14/2025   Dental   Dentist two times every year? Yes         Today's PHQ-2 Score:       7/14/2025     9:04 AM   PHQ-2 ( 1999 [employer])   Q1: Little interest or pleasure in doing things 1   Q2: Feeling down, depressed or hopeless 1   PHQ-2 Score 2    Q1: Little interest or pleasure in doing things Several days   Q2: Feeling down, depressed or hopeless Several days   PHQ-2 Score 2       Patient-reported  "          7/14/2025   Substance Use   Alcohol more than 3/day or more than 7/wk No   Do you use any other substances recreationally? (!) CANNABIS PRODUCTS     Social History     Tobacco Use    Smoking status: Former     Types: Cigarettes     Passive exposure: Past    Smokeless tobacco: Never   Vaping Use    Vaping status: Never Used   Substance Use Topics    Alcohol use: Yes     Comment: very rare-1-2x year    Drug use: Yes     Types: Marijuana     Comment: occasionally          Mammogram Screening - Patient under 40 years of age: Routine Mammogram Screening not recommended.         7/14/2025   STI Screening   New sexual partner(s) since last STI/HIV test? No     History of abnormal Pap smear: YES - reflected in Problem List and Health Maintenance accordingly        Latest Ref Rng & Units 3/1/2021     4:03 PM 3/1/2021     4:00 PM   PAP / HPV   PAP (Historical)  NIL     HPV 16 DNA NEG^Negative  Negative    HPV 18 DNA NEG^Negative  Negative    Other HR HPV NEG^Negative  Negative            7/14/2025   Contraception/Family Planning   Questions about contraception or family planning (!) YES    What are your periods like? (!) SPOTTING        Reviewed and updated as needed this visit by Provider                    ROS  Comprehensive 12-point review of systems was completed and negative except as noted in HPI.       Objective    Exam  /70 (BP Location: Right arm, Patient Position: Sitting)   Pulse 60   Temp 97.6  F (36.4  C)   Resp 16   Ht 1.753 m (5' 9\")   Wt 53.5 kg (118 lb)   LMP 06/29/2025 (Exact Date)   SpO2 98%   BMI 17.43 kg/m     Estimated body mass index is 17.43 kg/m  as calculated from the following:    Height as of this encounter: 1.753 m (5' 9\").    Weight as of this encounter: 53.5 kg (118 lb).    Physical Exam  Constitutional: In no acute distress.  Clean appearance. Appear thin, but not toxic or ill appearing.   Ears: Bilateral TMs are intact without any erythema or effusion.  Grossly normal " hearing.  Oropharynx: Normal mucosa.  Dentition and gingiva is appropriate.  Posterior oropharynx without any abnormalities.  Neck: Supple.  Trachea is midline.  No thyromegaly.  Neck is without tenderness or masses.  Cardiovascular: Regular rate and rhythm.  Normal peripheral perfusion.   Respiratory: Lungs are clear bilaterally.  Normal respiratory effort.  Skin: Skin is without significant rashes. No suspicious moles.  Middle of the upper back with a single slightly raised brown-colored mole that is slightly rough in texture.  Regular pigmentation and regular borders.  Gastrointestinal: Soft and flat.  Normal bowel sounds.  Nontender throughout upon palpation.  No organomegaly or masses.  Negative for CVA tenderness.  Musculoskeletal: Normal range of motion of extremities.  Gait normal.  Able to mount exam table without difficulties.  Psychiatric: Appropriate affect and demeanor.  Memory intact.  Good insight and judgment.  Neurologic: Sensation and temperature of extremities appropriate.  No tremor or involuntary movement noted.          Signed Electronically by: Clari Rosales NP

## 2025-07-15 ENCOUNTER — PATIENT OUTREACH (OUTPATIENT)
Dept: CARE COORDINATION | Facility: CLINIC | Age: 40
End: 2025-07-15
Payer: COMMERCIAL

## 2025-07-15 LAB
ANA SER QL IF: NEGATIVE
B BURGDOR IGG+IGM SER QL: 0.15

## 2025-07-17 ENCOUNTER — PATIENT OUTREACH (OUTPATIENT)
Dept: CARE COORDINATION | Facility: CLINIC | Age: 40
End: 2025-07-17

## 2025-07-24 ENCOUNTER — OFFICE VISIT (OUTPATIENT)
Dept: FAMILY MEDICINE | Facility: CLINIC | Age: 40
End: 2025-07-24
Payer: COMMERCIAL

## 2025-07-24 VITALS
BODY MASS INDEX: 17.61 KG/M2 | WEIGHT: 118.9 LBS | DIASTOLIC BLOOD PRESSURE: 66 MMHG | RESPIRATION RATE: 20 BRPM | SYSTOLIC BLOOD PRESSURE: 102 MMHG | HEIGHT: 69 IN | TEMPERATURE: 98.2 F | OXYGEN SATURATION: 98 % | HEART RATE: 86 BPM

## 2025-07-24 DIAGNOSIS — Z30.430 ENCOUNTER FOR INSERTION OF INTRAUTERINE CONTRACEPTIVE DEVICE: Primary | ICD-10-CM

## 2025-07-24 LAB — HCG UR QL: NEGATIVE

## 2025-07-24 RX ORDER — HYDROCODONE BITARTRATE AND ACETAMINOPHEN 5; 325 MG/1; MG/1
1 TABLET ORAL EVERY 6 HOURS PRN
Qty: 10 TABLET | Refills: 0 | Status: SHIPPED | OUTPATIENT
Start: 2025-07-24 | End: 2025-07-27

## 2025-07-24 RX ORDER — FAMOTIDINE 20 MG
1000 TABLET ORAL DAILY
COMMUNITY

## 2025-07-24 RX ORDER — COPPER 313.4 MG/1
1 INTRAUTERINE DEVICE INTRAUTERINE SEE ADMIN INSTRUCTIONS
Status: ACTIVE | COMMUNITY
Start: 2025-07-24

## 2025-07-24 RX ORDER — MULTIPLE VITAMINS W/ MINERALS TAB 9MG-400MCG
1 TAB ORAL DAILY
COMMUNITY

## 2025-07-24 RX ORDER — COPPER 313.4 MG/1
1 INTRAUTERINE DEVICE INTRAUTERINE ONCE
COMMUNITY

## 2025-07-24 RX ADMIN — COPPER 1 EACH: 313.4 INTRAUTERINE DEVICE INTRAUTERINE at 15:08

## 2025-07-24 NOTE — LETTER
2025    Mignon Treviño   1985        To Whom it May Concern;    Please excuse Mignon Treviño from work for a healthcare visit on 2025.    Sincerely,        Michela Haq MD

## 2025-07-24 NOTE — PROGRESS NOTES
"  Assessment & Plan     Encounter for insertion of intrauterine contraceptive device  Patient is interested in the ParaGard IUD as this medication does not have any hormones in it.  I did discuss with patient in detail that the ParaGard IUD while does not have hormones does induce a significant amount of bleeding for each period.  Discussed with patient that it lasts for 10 years however have not had any patient's workup today and due to the bleeding aspect.  I discussed with patient that patients do have a lot of bleeding states that they tend to bleed between 10 to 12 days each menstrual cycle and need to use an ultra tampon and a pad that they changed multiple times in the day.    Patient did mention that the reason why she wants a nonhormonal IUD, is because she was on the Liletta in the past as well as on birth control pills and both of them made her mental health significantly worse and after stopping those medications her mental health is significantly improved.  Through shared decision-making patient decided on getting the ParaGard IUD knowing the side effects that can cause and will take it out if need be if the side effects of the bleeding become too much for her to tolerate.  - paragard intrauterine copper device; 1 each by Intrauterine route once.  - paragard intrauterine copper IUD device 1 each  - INSERTION INTRAUTERINE DEVICE  - HCG qualitative urine  - PRIMARY CARE FOLLOW-UP SCHEDULING; Future      Subjective   Mignon is a 40 year old, presenting for the following health issues:  IUD (Wants Paraguard - has had bad bad mental health struggles with hormonal birth control in the past )        7/24/2025     2:29 PM   Additional Questions   Roomed by MAE RUTH     IUGALO        Here for IUD placement    Objective    /66   Pulse 86   Temp 98.2  F (36.8  C) (Oral)   Resp 20   Ht 1.75 m (5' 8.9\")   Wt 53.9 kg (118 lb 14.4 oz)   LMP 06/29/2025 (Exact Date)   SpO2 98%   BMI 17.61 kg/m    Body mass " index is 17.61 kg/m .  Physical Exam     Signed Electronically by: Michela Haq MD

## 2025-07-24 NOTE — PROGRESS NOTES
"IUD Insertion:  CONSULT:    Is a pregnancy test required: Yes.  Was it positive or negative?  Negative  Was a consent obtained?  Yes    Subjective: Mignon Treviño is a 40 year old  presents for IUD and desires Paragard type IUD.    Patient has been given the opportunity to ask questions about all forms of birth control, including all options appropriate for Mignon Treviño. Discussed that no method of birth control, except abstinence is 100% effective against pregnancy or sexually transmitted infection.     Mignon Treviño understands she may have the IUD removed at any time. IUD should be removed by a health care provider.    The entire insertion procedure was reviewed with the patient, including care after placement.    Patient's last menstrual period was 2025 (exact date). . No allergy to betadine or shellfish. Patient declines STD screening  hCG Urine Qualitative   Date Value Ref Range Status   2025 Negative Negative Final     Comment:     This test is for screening purposes.  Results should be interpreted along with the clinical picture.  Confirmation testing is available if warranted by ordering [id number], HCG Quantitative Pregnancy.         /66   Pulse 86   Temp 98.2  F (36.8  C) (Oral)   Resp 20   Ht 1.75 m (5' 8.9\")   Wt 53.9 kg (118 lb 14.4 oz)   LMP 2025 (Exact Date)   SpO2 98%   BMI 17.61 kg/m      Pelvic Exam:   EG/BUS: normal genital architecture without lesions, erythema or abnormal secretions.   Vagina: moist, pink, rugae with physiologic discharge and secretions  Cervix: Multiparous no lesions and pink, moist, closed, without lesion or CMT  Uterus: antegradeposition, mobile, no pain  Adnexa: within normal limits and no masses, nodularity, tenderness    PROCEDURE NOTE: -- IUD Insertion    Reason for Insertion: contraception      Under sterile technique, cervix was visualized with speculum and prepped with Betadine solution swab x 3. Tenaculum was placed for " stability. The uterus was gently straightened and sounded to 2.5 cm. IUD prepared for placement, and IUD inserted according to 's instructions without difficulty or significant resitance, and deployed at the fundus. The strings were visualized and trimmed to 7.0 cm from the external os. Tenaculum was removed and hemostasis noted. Speculum removed.  Patient tolerated procedure well.    EBL: minimal    Complications: none    ASSESSMENT:     ICD-10-CM    1. Encounter for insertion of intrauterine contraceptive device  Z30.430 paragard intrauterine copper device     paragard intrauterine copper IUD device 1 each     INSERTION INTRAUTERINE DEVICE     HCG qualitative urine     HCG qualitative urine     CANCELED: hCG Quantitative Pregnancy     CANCELED: hCG Quantitative Pregnancy           PLAN:    Given 's handouts, including when to have IUD removed, list of danger s/sx, side effects and follow up recommended. Encouraged condom use for prevention of STD. Back up contraception advised for 7 days if progestin method. Advised to call for any fever, for prolonged or severe pain or bleeding, abnormal vaginal discharge, or unable to palpate strings. She was advised to use pain medications (ibuprofen) as needed for mild to moderate pain. Advised to follow-up in clinic in 4-6 weeks for IUD string check if unable to find strings or as directed by provider.     Postprocedure pain  Patient was having significant pain after procedure so offered her hydrocodone which patient accepted.      Michela Haq MD

## 2025-07-25 NOTE — PROCEDURES
"IUD Insertion:  CONSULT:    Is a pregnancy test required: Yes.  Was it positive or negative?  Negative  Was a consent obtained?  Yes    Subjective: Mignon Treviño is a 40 year old  presents for IUD and desires Paragard type IUD.    Patient has been given the opportunity to ask questions about all forms of birth control, including all options appropriate for Mignon Treviño. Discussed that no method of birth control, except abstinence is 100% effective against pregnancy or sexually transmitted infection.     Mignon Treviño understands she may have the IUD removed at any time. IUD should be removed by a health care provider.    The entire insertion procedure was reviewed with the patient, including care after placement.    Patient's last menstrual period was 2025 (exact date). . No allergy to betadine or shellfish. Patient declines STD screening  hCG Urine Qualitative   Date Value Ref Range Status   2025 Negative Negative Final     Comment:     This test is for screening purposes.  Results should be interpreted along with the clinical picture.  Confirmation testing is available if warranted by ordering [id number], HCG Quantitative Pregnancy.         /66   Pulse 86   Temp 98.2  F (36.8  C) (Oral)   Resp 20   Ht 1.75 m (5' 8.9\")   Wt 53.9 kg (118 lb 14.4 oz)   LMP 2025 (Exact Date)   SpO2 98%   BMI 17.61 kg/m      Pelvic Exam:   EG/BUS: normal genital architecture without lesions, erythema or abnormal secretions.   Vagina: moist, pink, rugae with physiologic discharge and secretions  Cervix: Multiparous no lesions and pink, moist, closed, without lesion or CMT  Uterus: antegradeposition, mobile, no pain  Adnexa: within normal limits and no masses, nodularity, tenderness    PROCEDURE NOTE: -- IUD Insertion    Reason for Insertion: contraception      Under sterile technique, cervix was visualized with speculum and prepped with Betadine solution swab x 3. Tenaculum was placed for " stability. The uterus was gently straightened and sounded to 2.5 cm. IUD prepared for placement, and IUD inserted according to 's instructions without difficulty or significant resitance, and deployed at the fundus. The strings were visualized and trimmed to 7.0 cm from the external os. Tenaculum was removed and hemostasis noted. Speculum removed.  Patient tolerated procedure well.    EBL: minimal    Complications: none    ASSESSMENT:     ICD-10-CM    1. Encounter for insertion of intrauterine contraceptive device  Z30.430 paragard intrauterine copper device     paragard intrauterine copper IUD device 1 each     INSERTION INTRAUTERINE DEVICE     HCG qualitative urine     HCG qualitative urine     CANCELED: hCG Quantitative Pregnancy     CANCELED: hCG Quantitative Pregnancy           PLAN:    Given 's handouts, including when to have IUD removed, list of danger s/sx, side effects and follow up recommended. Encouraged condom use for prevention of STD. Back up contraception advised for 7 days if progestin method. Advised to call for any fever, for prolonged or severe pain or bleeding, abnormal vaginal discharge, or unable to palpate strings. She was advised to use pain medications (ibuprofen) as needed for mild to moderate pain. Advised to follow-up in clinic in 4-6 weeks for IUD string check if unable to find strings or as directed by provider.     Postprocedure pain  Patient was having significant pain after procedure so offered her hydrocodone which patient accepted.      Michela Haq MD

## 2025-08-11 ENCOUNTER — PATIENT OUTREACH (OUTPATIENT)
Dept: CARE COORDINATION | Facility: CLINIC | Age: 40
End: 2025-08-11
Payer: COMMERCIAL

## 2025-08-13 ENCOUNTER — PATIENT OUTREACH (OUTPATIENT)
Dept: CARE COORDINATION | Facility: CLINIC | Age: 40
End: 2025-08-13
Payer: COMMERCIAL

## 2025-08-19 ENCOUNTER — OFFICE VISIT (OUTPATIENT)
Dept: INTERNAL MEDICINE | Facility: CLINIC | Age: 40
End: 2025-08-19
Payer: COMMERCIAL

## 2025-08-19 VITALS
HEART RATE: 63 BPM | TEMPERATURE: 97.6 F | DIASTOLIC BLOOD PRESSURE: 58 MMHG | SYSTOLIC BLOOD PRESSURE: 90 MMHG | WEIGHT: 118 LBS | HEIGHT: 69 IN | BODY MASS INDEX: 17.48 KG/M2 | OXYGEN SATURATION: 98 % | RESPIRATION RATE: 18 BRPM

## 2025-08-19 DIAGNOSIS — L98.9 SKIN LESION: ICD-10-CM

## 2025-08-19 DIAGNOSIS — F42.9 OBSESSIVE-COMPULSIVE DISORDER, UNSPECIFIED TYPE: Primary | ICD-10-CM

## 2025-08-19 DIAGNOSIS — F41.1 GENERALIZED ANXIETY DISORDER: ICD-10-CM

## 2025-08-19 PROCEDURE — 99214 OFFICE O/P EST MOD 30 MIN: CPT | Mod: 25 | Performed by: NURSE PRACTITIONER

## 2025-08-19 PROCEDURE — 3074F SYST BP LT 130 MM HG: CPT | Performed by: NURSE PRACTITIONER

## 2025-08-19 PROCEDURE — 11400 EXC TR-EXT B9+MARG 0.5 CM<: CPT | Performed by: NURSE PRACTITIONER

## 2025-08-19 PROCEDURE — 3078F DIAST BP <80 MM HG: CPT | Performed by: NURSE PRACTITIONER

## 2025-08-19 RX ORDER — SERTRALINE HYDROCHLORIDE 25 MG/1
1 TABLET, FILM COATED ORAL DAILY
COMMUNITY
Start: 2025-07-31 | End: 2025-08-19

## 2025-08-25 LAB
PATH REPORT.COMMENTS IMP SPEC: NORMAL
PATH REPORT.COMMENTS IMP SPEC: NORMAL
PATH REPORT.FINAL DX SPEC: NORMAL
PATH REPORT.GROSS SPEC: NORMAL
PATH REPORT.MICROSCOPIC SPEC OTHER STN: NORMAL
PATH REPORT.RELEVANT HX SPEC: NORMAL
PHOTO IMAGE: NORMAL

## 2025-08-26 ENCOUNTER — MYC MEDICAL ADVICE (OUTPATIENT)
Dept: INTERNAL MEDICINE | Facility: CLINIC | Age: 40
End: 2025-08-26
Payer: COMMERCIAL

## 2025-09-02 ENCOUNTER — OFFICE VISIT (OUTPATIENT)
Dept: INTERNAL MEDICINE | Facility: CLINIC | Age: 40
End: 2025-09-02
Payer: COMMERCIAL

## 2025-09-02 VITALS
HEART RATE: 80 BPM | WEIGHT: 121 LBS | BODY MASS INDEX: 17.92 KG/M2 | DIASTOLIC BLOOD PRESSURE: 82 MMHG | HEIGHT: 69 IN | OXYGEN SATURATION: 98 % | TEMPERATURE: 97.6 F | RESPIRATION RATE: 18 BRPM | SYSTOLIC BLOOD PRESSURE: 104 MMHG

## 2025-09-02 DIAGNOSIS — Z12.4 CERVICAL CANCER SCREENING: ICD-10-CM

## 2025-09-02 DIAGNOSIS — H69.93 DYSFUNCTION OF BOTH EUSTACHIAN TUBES: Primary | ICD-10-CM

## 2025-09-02 DIAGNOSIS — F41.1 GENERALIZED ANXIETY DISORDER: ICD-10-CM

## 2025-09-02 DIAGNOSIS — Z97.5 IUD (INTRAUTERINE DEVICE) IN PLACE: ICD-10-CM

## 2025-09-02 PROCEDURE — G0145 SCR C/V CYTO,THINLAYER,RESCR: HCPCS | Performed by: NURSE PRACTITIONER

## 2025-09-02 PROCEDURE — G2211 COMPLEX E/M VISIT ADD ON: HCPCS | Performed by: NURSE PRACTITIONER

## 2025-09-02 PROCEDURE — 99214 OFFICE O/P EST MOD 30 MIN: CPT | Performed by: NURSE PRACTITIONER

## 2025-09-02 PROCEDURE — 87624 HPV HI-RISK TYP POOLED RSLT: CPT | Performed by: NURSE PRACTITIONER

## 2025-09-02 PROCEDURE — 3074F SYST BP LT 130 MM HG: CPT | Performed by: NURSE PRACTITIONER

## 2025-09-02 PROCEDURE — 3079F DIAST BP 80-89 MM HG: CPT | Performed by: NURSE PRACTITIONER

## 2025-09-02 RX ORDER — FLUTICASONE PROPIONATE 50 MCG
1 SPRAY, SUSPENSION (ML) NASAL DAILY
Qty: 16 G | Refills: 3 | Status: SHIPPED | OUTPATIENT
Start: 2025-09-02

## 2025-09-03 LAB
HPV HR 12 DNA CVX QL NAA+PROBE: POSITIVE
HPV16 DNA CVX QL NAA+PROBE: NEGATIVE
HPV18 DNA CVX QL NAA+PROBE: NEGATIVE
HUMAN PAPILLOMA VIRUS FINAL DIAGNOSIS: ABNORMAL

## 2025-09-04 ENCOUNTER — PATIENT OUTREACH (OUTPATIENT)
Dept: INTERNAL MEDICINE | Facility: CLINIC | Age: 40
End: 2025-09-04
Payer: COMMERCIAL

## 2025-09-04 LAB
BKR AP ASSOCIATED HPV REPORT: NORMAL
BKR LAB AP GYN ADEQUACY: NORMAL
BKR LAB AP GYN INTERPRETATION: NORMAL
BKR LAB AP PREVIOUS ABNORMAL: NORMAL
PATH REPORT.COMMENTS IMP SPEC: NORMAL
PATH REPORT.COMMENTS IMP SPEC: NORMAL
PATH REPORT.RELEVANT HX SPEC: NORMAL